# Patient Record
Sex: FEMALE | Race: WHITE | Employment: FULL TIME | URBAN - METROPOLITAN AREA
[De-identification: names, ages, dates, MRNs, and addresses within clinical notes are randomized per-mention and may not be internally consistent; named-entity substitution may affect disease eponyms.]

---

## 2017-01-04 ENCOUNTER — DOCUMENTATION ONLY (OUTPATIENT)
Dept: OTHER | Facility: CLINIC | Age: 57
End: 2017-01-04

## 2017-01-04 DIAGNOSIS — Z71.89 ADVANCE CARE PLANNING: Primary | Chronic | ICD-10-CM

## 2017-01-19 ENCOUNTER — RADIANT APPOINTMENT (OUTPATIENT)
Dept: MAMMOGRAPHY | Facility: CLINIC | Age: 57
End: 2017-01-19
Payer: COMMERCIAL

## 2017-01-19 ENCOUNTER — OFFICE VISIT (OUTPATIENT)
Dept: FAMILY MEDICINE | Facility: CLINIC | Age: 57
End: 2017-01-19
Payer: COMMERCIAL

## 2017-01-19 VITALS
TEMPERATURE: 97.1 F | BODY MASS INDEX: 26.24 KG/M2 | OXYGEN SATURATION: 99 % | HEART RATE: 90 BPM | WEIGHT: 157.5 LBS | HEIGHT: 65 IN | SYSTOLIC BLOOD PRESSURE: 118 MMHG | DIASTOLIC BLOOD PRESSURE: 78 MMHG

## 2017-01-19 DIAGNOSIS — Z12.31 VISIT FOR SCREENING MAMMOGRAM: ICD-10-CM

## 2017-01-19 DIAGNOSIS — L72.3 SEBACEOUS CYST: Primary | ICD-10-CM

## 2017-01-19 PROCEDURE — G0202 SCR MAMMO BI INCL CAD: HCPCS | Mod: TC

## 2017-01-19 PROCEDURE — 99212 OFFICE O/P EST SF 10 MIN: CPT | Performed by: FAMILY MEDICINE

## 2017-01-19 ASSESSMENT — PAIN SCALES - GENERAL: PAINLEVEL: NO PAIN (0)

## 2017-01-19 NOTE — MR AVS SNAPSHOT
After Visit Summary   1/19/2017    Jeni Portillo    MRN: [id number]           Patient Information     Date Of Birth          1960        Visit Information        Provider Department      1/19/2017 9:30 AM Rosanne Kimble MD [facility]        Today's Diagnoses     Sebaceous cyst    -  1       Care Instructions      Sebaceous Cyst [No Infection]  A sebaceous cyst is a term that most commonly refers to 2 types of cysts:  epidermoid  (from skin), and  pilar  (from hair follicles). A few things about these cysts:    A cyst is a sac filled with material that is often cheesy, fatty, oily, or fibrous material. The material in them can be thick (like cottage cheese) or liquid.    They form slowly under the skin, and can be found on most parts of the body. They are most often found in hairier areas like the scalp, face, upper back, and genitals.    You can usually move them slightly if you try.    They can be smaller than a pea or as large as a couple of inches.    They are usually not painful, unless they become inflamed or infected.    The area around the cyst may smell bad, and if the cyst breaks open, the material inside it often smells bad as well.  Causes  Sebaceous cysts are often caused when skin (epidermal) cells move under the skin surface, or are covered over by it, but continue to multiply, like skin does normally. They then form a wall around themselves (cyst) and secrete normal skin fluids (keratin). This can happen for developmental reason, but is often from skin trauma.  Cysts are often found around hair follicles, which in a sense are like cysts, but with openings, through which normal lubricating oils for your hair are excreted. The opening can become blocked or the site inflamed, causing a cyst. This often occurs when there is damage to the hair follicles by an abrasion or wound.  Symptoms  The following are symptoms of a sebaceous cyst:    Feeling a lump just beneath  the skin.    It may or may not be painful.    The cyst may or may not smell bad.    The cyst may become inflamed or red.    The cyst may leak fluid or thick material.  Home care  Sebaceous cysts often go away without any treatment. If your sebaceous cyst doesn't, and is bothersome, it may be drained or removed. If the cyst drains on its own, it may return. Resist the temptation to squeeze, pop, stick a needle in it, or cut it open. This often leads to an infection and scarring. If it gets severely inflamed or infected, you should seek medical treatment. Be sure to clean the cyst area when bathing or showering. Watch for the signs of infection listed below.  Follow-up care  Follow up with your doctor or as advised by our staff.  When to seek medical care  Get prompt medical attention if any of the following occur:    Swelling, redness, or pain    Pus coming from the cyst    1865-0189 [employer]. [address] [place] [address]. All rights reserved. This information is not intended as a substitute for professional medical care. Always follow your healthcare professional's instructions.      [facility]    If you have any questions regarding to your visit please contact your care team:       Team Purple:   Clinic Hours Telephone Number   [address]   7am-7pm  Monday - Thursday   7am-5pm  Fridays  [phone]  (Appointment scheduling available 24/7)    Questions about your Visit?   Team Line:  [phone]   Urgent Care - [place] - 11am-9pm Monday-Friday Saturday-Sunday- 9am-5pm   [place] - 5pm-9pm Monday-Friday Saturday-Sunday- 9am-5pm  [phone] - Rad[place] Bullock  [phone] - [place]       What options do I have for visits at the clinic other than the traditional office visit?  To expand how we care for you, many of our providers are utilizing electronic visits (e-visits) and  telephone visits, when medically appropriate, for interactions with their patients rather than a visit in the clinic.   We also offer nurse visits for many medical concerns. Just like any other service, we will bill your insurance company for this type of visit based on time spent on the phone with your provider. Not all insurance companies cover these visits. Please check with your medical insurance if this type of visit is covered. You will be responsible for any charges that are not paid by your insurance.      E-visits via [email or web address]:  generally incur a $35.00 fee.  Telephone visits:  Time spent on the phone: *charged based on time that is spent on the phone in increments of 10 minutes. Estimated cost:   5-10 mins $30.00   11-20 mins. $59.00   21-30 mins. $85.00     Use [employer] (secure email communication and access to your chart) to send your primary care provider a message or make an appointment. Ask someone on your Team how to sign up for [employer].  For a Price Quote for your services, please call our [employer] Line at [phone].  As always, Thank you for trusting us with your health care needs!    Discharged By: An          Follow-ups after your visit        Additional Services     DERMATOLOGY REFERRAL       Your provider has referred you to: [facility]: [facility]  [place] [phone]   [email or web address]  UM: Dermatology [facility] [phone]   [email or web address]  UM: [facility] [phone]   [email or web address]  [place]: [facility] [place] [phone]  [email or web address]  [facility]: [facility]  [place] [phone]   [email or web address]    Please be aware that coverage of these services is subject to the terms and limitations of your health insurance plan.  Call  "member services at your health plan with any benefit or coverage questions.      Please bring the following with you to your appointment:    (1) Any X-Rays, CTs or MRIs which have been performed.  Contact the facility where they were done to arrange for  prior to your scheduled appointment.    (2) List of current medications  (3) This referral request   (4) Any documents/labs given to you for this referral                  Who to contact     If you have questions or need follow up information about today's clinic visit or your schedule please contact [facility] directly at [phone].  Normal or non-critical lab and imaging results will be communicated to you by MyChart, letter or phone within 4 business days after the clinic has received the results. If you do not hear from us within 7 days, please contact the clinic through [employer] or phone. If you have a critical or abnormal lab result, we will notify you by phone as soon as possible.  Submit refill requests through [employer] or call your pharmacy and they will forward the refill request to us. Please allow 3 business days for your refill to be completed.          Additional Information About Your Visit        MyChart Information     [employer] gives you secure access to your electronic health record. If you see a primary care provider, you can also send messages to your care team and make appointments. If you have questions, please call your primary care clinic.  If you do not have a primary care provider, please call [phone] and they will assist you.        Care EveryWhere ID     This is your Care EveryWhere ID. This could be used by other organizations to access your [place] medical records  [id number]        Your Vitals Were     Pulse Temperature Height BMI (Body Mass Index) Pulse Oximetry       90 97.1  F (36.2  C) (Oral) 5' 5\" (1.651 m) 26.21 kg/m2 99%        Blood Pressure from Last 3 Encounters:   01/19/17 118/78   10/31/16 " 116/80   10/24/14 112/64    Weight from Last 3 Encounters:   01/19/17 157 lb 8 oz (71.442 kg)   10/31/16 159 lb (72.122 kg)   10/24/14 157 lb 8 oz (71.442 kg)              We Performed the Following     DERMATOLOGY REFERRAL          Today's Medication Changes          These changes are accurate as of: 1/19/17  9:53 AM.  If you have any questions, ask your nurse or doctor.               These medicines have changed or have updated prescriptions.        Dose/Directions    docosanol 10 % Crea cream   Commonly known as:  ABREVA   This may have changed:    - when to take this  - reasons to take this   Used for:  Cold sore        Apply  topically 5 times daily.   Quantity:  1 Tube   Refills:  5       fexofenadine 60 MG tablet   Commonly known as:  ALLEGRA   This may have changed:    - when to take this  - reasons to take this   Used for:  Seasonal allergies        Dose:  60 mg   Take 1 tablet by mouth 2 times daily.   Quantity:  180 tablet   Refills:  3                Primary Care Provider Office Phone # Fax #    Rosanne Kimble [phone]       [address] [address]        Thank you!     Thank you for choosing [facility]  for your care. Our goal is always to provide you with excellent care. Hearing back from our patients is one way we can continue to improve our services. Please take a few minutes to complete the written survey that you may receive in the mail after your visit with us. Thank you!             Your Updated Medication List - Protect others around you: Learn how to safely use, store and throw away your medicines at www.disposemymeds.org.          This list is accurate as of: 1/19/17  9:53 AM.  Always use your most recent med list.                   Brand Name Dispense Instructions for use    docosanol 10 % Crea cream    ABREVA    1 Tube    Apply  topically 5 times daily.       fexofenadine 60 MG tablet    ALLEGRA    180 tablet     Take 1 tablet by mouth 2 times daily.       ibuprofen 200 MG tablet    ADVIL/MOTRIN    100 tablet    Take 1 tablet by mouth every 4 hours as needed for pain.

## 2017-01-19 NOTE — PATIENT INSTRUCTIONS
Sebaceous Cyst [No Infection]  A sebaceous cyst is a term that most commonly refers to 2 types of cysts:  epidermoid  (from skin), and  pilar  (from hair follicles). A few things about these cysts:    A cyst is a sac filled with material that is often cheesy, fatty, oily, or fibrous material. The material in them can be thick (like cottage cheese) or liquid.    They form slowly under the skin, and can be found on most parts of the body. They are most often found in hairier areas like the scalp, face, upper back, and genitals.    You can usually move them slightly if you try.    They can be smaller than a pea or as large as a couple of inches.    They are usually not painful, unless they become inflamed or infected.    The area around the cyst may smell bad, and if the cyst breaks open, the material inside it often smells bad as well.  Causes  Sebaceous cysts are often caused when skin (epidermal) cells move under the skin surface, or are covered over by it, but continue to multiply, like skin does normally. They then form a wall around themselves (cyst) and secrete normal skin fluids (keratin). This can happen for developmental reason, but is often from skin trauma.  Cysts are often found around hair follicles, which in a sense are like cysts, but with openings, through which normal lubricating oils for your hair are excreted. The opening can become blocked or the site inflamed, causing a cyst. This often occurs when there is damage to the hair follicles by an abrasion or wound.  Symptoms  The following are symptoms of a sebaceous cyst:    Feeling a lump just beneath the skin.    It may or may not be painful.    The cyst may or may not smell bad.    The cyst may become inflamed or red.    The cyst may leak fluid or thick material.  Home care  Sebaceous cysts often go away without any treatment. If your sebaceous cyst doesn't, and is bothersome, it may be drained or removed. If the cyst drains on its own, it may  return. Resist the temptation to squeeze, pop, stick a needle in it, or cut it open. This often leads to an infection and scarring. If it gets severely inflamed or infected, you should seek medical treatment. Be sure to clean the cyst area when bathing or showering. Watch for the signs of infection listed below.  Follow-up care  Follow up with your doctor or as advised by our staff.  When to seek medical care  Get prompt medical attention if any of the following occur:    Swelling, redness, or pain    Pus coming from the cyst    8411-5386 [employer]. [address] [place] [address]. All rights reserved. This information is not intended as a substitute for professional medical care. Always follow your healthcare professional's instructions.      [facility]    If you have any questions regarding to your visit please contact your care team:       Team Purple:   Clinic Hours Telephone Number   [address] Dr[address]   7am-7pm  Monday - Thursday   7am-5pm  Fridays  [phone]  (Appointment scheduling available 24/7)    Questions about your Visit?   Team Line:  [phone]   [facility] - [place] [facility] - 11am-9pm Monday-Friday Saturday-Sunday- 9am-5pm   [place] - 5pm-9pm Monday-Friday Saturday-Sunday- 9am-5pm  [phone] - [place]   [phone] - [place]       What options do I have for visits at the clinic other than the traditional office visit?  To expand how we care for you, many of our providers are utilizing electronic visits (e-visits) and telephone visits, when medically appropriate, for interactions with their patients rather than a visit in the clinic.   We also offer nurse visits for many medical concerns. Just like any other service, we will bill your insurance company for this type of visit based on time spent on the phone with your provider. Not all insurance companies cover  these visits. Please check with your medical insurance if this type of visit is covered. You will be responsible for any charges that are not paid by your insurance.      E-visits via [employer]:  generally incur a $35.00 fee.  Telephone visits:  Time spent on the phone: *charged based on time that is spent on the phone in increments of 10 minutes. Estimated cost:   5-10 mins $30.00   11-20 mins. $59.00   21-30 mins. $85.00     Use [employer] (secure email communication and access to your chart) to send your primary care provider a message or make an appointment. Ask someone on your Team how to sign up for [employer].  For a Price Quote for your services, please call our Consumer Price Line at [phone].  As always, Thank you for trusting us with your health care needs!    Discharged By: Catherine

## 2017-01-19 NOTE — NURSING NOTE
"Chief Complaint   Patient presents with     Derm Problem     Cyst on back by right shoulder blade x 5 years, would like to have it removed.       Initial /78 mmHg  Pulse 90  Temp(Src) 97.1  F (36.2  C) (Oral)  Ht 5' 5\" (1.651 m)  Wt 157 lb 8 oz (71.442 kg)  BMI 26.21 kg/m2  SpO2 99% Estimated body mass index is 26.21 kg/(m^2) as calculated from the following:    Height as of this encounter: 5' 5\" (1.651 m).    Weight as of this encounter: 157 lb 8 oz (71.442 kg).  BP completed using cuff size: regular    "

## 2017-01-19 NOTE — PROGRESS NOTES
SUBJECTIVE:                                                    Jeni Portillo is a 56 year old female who presents to clinic today for the following health issues:    Chief Complaint   Patient presents with     Derm Problem     Cyst on back by right shoulder blade x 5 years, would like to have it removed.     Cyst under the skin on her right shoulder blade that has been present for the past 5 years, endorses occasional drainage. She had 2 other cysts removed in the past.  No pain, redness, or current drainage.     Of note, completed mammogram this morning.     Problem list and histories reviewed & adjusted, as indicated.  Additional history: as documented    Patient Active Problem List   Diagnosis     Fibrocystic breast disease     Decreased libido     CARDIOVASCULAR SCREENING; LDL GOAL LESS THAN 160     Neck pain     External hemorrhoids     Shoulder joint pain     Advance care planning     Past Surgical History   Procedure Laterality Date     Tonsillectomy       Bunionectomy  6-2011     left     Colonoscopy       Orthopedic surgery       Bunionectomy on both feet       Social History   Substance Use Topics     Smoking status: Never Smoker      Smokeless tobacco: Never Used     Alcohol Use: 0.0 oz/week     0 Standard drinks or equivalent per week      Comment: 3 drinks per week      Family History   Problem Relation Age of Onset     CANCER Maternal Grandfather      Lung cancer     CANCER Paternal Grandfather      Lung and brain cancer     Obesity Sister      DIABETES No family hx of      Hypertension No family hx of      Obesity Sister      Obesity Brother      Obesity Sister      Obesity Brother          BP Readings from Last 3 Encounters:   01/19/17 118/78   10/31/16 116/80   10/24/14 112/64    Wt Readings from Last 3 Encounters:   01/19/17 157 lb 8 oz (71.442 kg)   10/31/16 159 lb (72.122 kg)   10/24/14 157 lb 8 oz (71.442 kg)                    Problem list, Medication list, Allergies, and  "Medical/Social/Surgical histories reviewed in [facility] and updated as appropriate.    This document serves as a record of the services and decisions personally performed and made by Rosanne Kimble MD. It was created on his/her behalf by Fazal Cochran, a trained medical scribe. The creation of this document is based the provider's statements to the medical scribe.  Scribe Fazal Cochran 9:45 AM, January 19, 2017    OBJECTIVE:                                                    /78 mmHg  Pulse 90  Temp(Src) 97.1  F (36.2  C) (Oral)  Ht 5' 5\" (1.651 m)  Wt 157 lb 8 oz (71.442 kg)  BMI 26.21 kg/m2  SpO2 99%  Body mass index is 26.21 kg/(m^2).  GENERAL: healthy, alert and no distress  SKIN: 1 cm firm rubbery subcutaneous mass on the posterior right shoulder, non tender     Diagnostic Test Results:  none      ASSESSMENT/PLAN:                                                        ICD-10-CM    1. Sebaceous cyst L72.3 DERMATOLOGY REFERRAL       FUTURE APPOINTMENTS:       - Make appointment with dermatology specialist  Discussed this may not be covered by insurance, considered cosmetic procedure  Return in October for physical with pap smear.     The information in this document, created by the medical scribe for me, accurately reflects the services I personally performed and the decisions made by me. I have reviewed and approved this document for accuracy prior to leaving the patient care area.  Rosanne Kimble MD  9:45 AM, 01/19/2017    Rosanne Kimble MD  [facility]    Start 9:43 AM   End 9:48 AM        "

## 2017-11-28 ENCOUNTER — OFFICE VISIT (OUTPATIENT)
Dept: FAMILY MEDICINE | Facility: CLINIC | Age: 57
End: 2017-11-28
Payer: COMMERCIAL

## 2017-11-28 VITALS — DIASTOLIC BLOOD PRESSURE: 70 MMHG | SYSTOLIC BLOOD PRESSURE: 120 MMHG

## 2017-11-28 DIAGNOSIS — M67.432 GANGLION CYST OF WRIST, LEFT: Primary | ICD-10-CM

## 2017-11-28 PROCEDURE — 99213 OFFICE O/P EST LOW 20 MIN: CPT | Performed by: FAMILY MEDICINE

## 2017-11-28 NOTE — PATIENT INSTRUCTIONS
Ganglion Cyst: Hand    A ganglion cyst is a firm, fluid-filled lump that can suddenly appear on the front or back of the wrist or at the base of a finger. These cysts grow from normal tissue in the wrist and fingers, and range in size from a pea to a peach pit. Although ganglion cysts are common, they don t spread, and they don t become cancerous. They can occur after an injury, but many times it isn t known why they grow. Ganglion cysts can change in size, and may go away on their own.  Symptoms  A ganglion cyst is sometimes painful, especially when it first occurs. Constantly using your hand or wrist can make the cyst enlarge and hurt more. Some hand and wrist movements, such as grasping things, may also be difficult.  How a ganglion cyst develops  Your wrist and hand are made up of many small bones that meet at joints. Tendons attach muscles to the bones at the joints. The tendons allow the joints to bend and straighten. Both tendons and joints are lined with tissue called synovium. This tissue makes a thick fluid that keeps the joints and tendons moving easily. Sometimes the tissue balloons out from the joint or tendons and forms a cyst. As the cyst fills with fluid and grows, it appears as a lump you can feel.  Where ganglion cysts occur  A ganglion cyst can occur anywhere on the hand near a joint. Cysts most commonly appear on the back or palm side of the wrist, or on the palm at the base of a finger. Your doctor can usually diagnose a cyst by examining the lump. He or she may draw off a little fluid or order an X-ray to rule out other problems.  Treating a ganglion cyst  Your healthcare provider may just watch your ganglion cyst. Many shrink and become painless without treatment. Some disappear altogether. If the cyst is unsightly or painful, or makes it hard for you to use your hand, your healthcare provider can treat it or, if needed, remove it surgically.  Nonsurgical treatment  To shrink the cyst, your  provider may remove (aspirate) the fluid with a needle. If the cyst hurts, your provider may also give you an injection of an anti-inflammatory, such as cortisone, to relieve the irritation. Your hand may then be wrapped to help keep the cyst from recurring.  Surgery  If the cyst reappears after treatment, your healthcare provider may remove it surgically. A section of the tissue that lines the joint or tendon is removed along with the cyst. This helps prevent another cyst from forming, although recurrence of the cyst is still possible after surgery. Usually, only your hand or arm is numbed, and you can go home a few hours after surgery. Your hand may be in a splint for several days.  Date Last Reviewed: 9/10/2015    2007-1649 The [employer]. [address] [place] [address], [place] [address]. All rights reserved. This information is not intended as a substitute for professional medical care. Always follow your healthcare professional's instructions.      [facility]    If you have any questions regarding to your visit please contact your care team:       Team Purple:   Clinic Hours Telephone Number   Dr. Eliana Johnson   7am-7pm  Monday - Thursday   7am-5pm  Fridays  [phone]  (Appointment scheduling available 24/7)    Questions about your Visit?   Team Line:  [phone]   [facility] - 11am-9pm Monday-Friday Saturday-Sunday- 9am-5pm   [place] - 5pm-9pm Monday-Friday Saturday-Sunday- 9am-5pm  [phone] - [place]   [phone] - [place]       What options do I have for visits at the clinic other than the traditional office visit?  To expand how we care for you, many of our providers are utilizing electronic visits (e-visits) and telephone visits, when medically appropriate, for interactions with their patients rather than a visit in the clinic.   We also offer nurse visits for many  medical concerns. Just like any other service, we will bill your insurance company for this type of visit based on time spent on the phone with your provider. Not all insurance companies cover these visits. Please check with your medical insurance if this type of visit is covered. You will be responsible for any charges that are not paid by your insurance.      E-visits via [employer]:  generally incur a $35.00 fee.  Telephone visits:  Time spent on the phone: *charged based on time that is spent on the phone in increments of 10 minutes. Estimated cost:   5-10 mins $30.00   11-20 mins. $59.00   21-30 mins. $85.00     Use [employer] (secure email communication and access to your chart) to send your primary care provider a message or make an appointment. Ask someone on your Team how to sign up for [employer].  For a Price Quote for your services, please call our Consumer Price Line at [phone].  As always, Thank you for trusting us with your health care needs!    Discharged By: Catherine

## 2017-11-28 NOTE — MR AVS SNAPSHOT
After Visit Summary   11/28/2017    Jeni Portillo    MRN: [id number]           Patient Information     Date Of Birth          1960        Visit Information        Provider Department      11/28/2017 9:00 AM Keegan Kat MD [facility]        Today's Diagnoses     Ganglion cyst of wrist, left    -  1      Care Instructions      Ganglion Cyst: Hand    A ganglion cyst is a firm, fluid-filled lump that can suddenly appear on the front or back of the wrist or at the base of a finger. These cysts grow from normal tissue in the wrist and fingers, and range in size from a pea to a peach pit. Although ganglion cysts are common, they don t spread, and they don t become cancerous. They can occur after an injury, but many times it isn t known why they grow. Ganglion cysts can change in size, and may go away on their own.  Symptoms  A ganglion cyst is sometimes painful, especially when it first occurs. Constantly using your hand or wrist can make the cyst enlarge and hurt more. Some hand and wrist movements, such as grasping things, may also be difficult.  How a ganglion cyst develops  Your wrist and hand are made up of many small bones that meet at joints. Tendons attach muscles to the bones at the joints. The tendons allow the joints to bend and straighten. Both tendons and joints are lined with tissue called synovium. This tissue makes a thick fluid that keeps the joints and tendons moving easily. Sometimes the tissue balloons out from the joint or tendons and forms a cyst. As the cyst fills with fluid and grows, it appears as a lump you can feel.  Where ganglion cysts occur  A ganglion cyst can occur anywhere on the hand near a joint. Cysts most commonly appear on the back or palm side of the wrist, or on the palm at the base of a finger. Your doctor can usually diagnose a cyst by examining the lump. He or she may draw off a little fluid or order an X-ray to rule out  other problems.  Treating a ganglion cyst  Your healthcare provider may just watch your ganglion cyst. Many shrink and become painless without treatment. Some disappear altogether. If the cyst is unsightly or painful, or makes it hard for you to use your hand, your healthcare provider can treat it or, if needed, remove it surgically.  Nonsurgical treatment  To shrink the cyst, your provider may remove (aspirate) the fluid with a needle. If the cyst hurts, your provider may also give you an injection of an anti-inflammatory, such as cortisone, to relieve the irritation. Your hand may then be wrapped to help keep the cyst from recurring.  Surgery  If the cyst reappears after treatment, your healthcare provider may remove it surgically. A section of the tissue that lines the joint or tendon is removed along with the cyst. This helps prevent another cyst from forming, although recurrence of the cyst is still possible after surgery. Usually, only your hand or arm is numbed, and you can go home a few hours after surgery. Your hand may be in a splint for several days.  Date Last Reviewed: 9/10/2015    4720-2253 [employer]. [address] [place] [address]. All rights reserved. This information is not intended as a substitute for professional medical care. Always follow your healthcare professional's instructions.      [facility]    If you have any questions regarding to your visit please contact your care team:       Team Purple:   Clinic Hours Telephone Number   Dr. Eliana Johnson   7am-7pm  Monday - Thursday   7am-5pm  Fridays  [phone]  (Appointment scheduling available 24/7)    Questions about your Visit?   Team Line:  [phone]   Urgent Care - [facility] Hanks and [place] [facility] - 11am-9pm Monday-Friday Saturday-Sunday- 9am-5pm   [place] - 5pm-9pm Monday-Friday Saturday-Sunday- 9am-5pm  [phone] -  [place]   [phone] - [place]       What options do I have for visits at the clinic other than the traditional office visit?  To expand how we care for you, many of our providers are utilizing electronic visits (e-visits) and telephone visits, when medically appropriate, for interactions with their patients rather than a visit in the clinic.   We also offer nurse visits for many medical concerns. Just like any other service, we will bill your insurance company for this type of visit based on time spent on the phone with your provider. Not all insurance companies cover these visits. Please check with your medical insurance if this type of visit is covered. You will be responsible for any charges that are not paid by your insurance.      E-visits via [employer]:  generally incur a $35.00 fee.  Telephone visits:  Time spent on the phone: *charged based on time that is spent on the phone in increments of 10 minutes. Estimated cost:   5-10 mins $30.00   11-20 mins. $59.00   21-30 mins. $85.00     Use [employer] (secure email communication and access to your chart) to send your primary care provider a message or make an appointment. Ask someone on your Team how to sign up for [employer].  For a Price Quote for your services, please call our Accredible Price Line at [phone].  As always, Thank you for trusting us with your health care needs!    Discharged By: An            Follow-ups after your visit        Additional Services     ORTHOPEDICS ADULT REFERRAL       Your provider has referred you to: [facility]: [facility] [phone]    [email or web address]    Please be aware that coverage of these services is subject to the terms and limitations of your health insurance plan.  Call member services at your health plan with any benefit or coverage questions.      Please bring the following to your appointment:    >>   Any x-rays, CTs or MRIs which have been performed.  Contact the  facility where they were done to arrange for  prior to your scheduled appointment.    >>   List of current medications   >>   This referral request   >>   Any documents/labs given to you for this referral                  Follow-up notes from your care team     Return in about 3 months (around 2/28/2018) for ganglion cyst.      Your next 10 appointments already scheduled     Feb 12, 2018  2:15 PM CST   PHYSICAL with Rosanne Kimble MD   [facility] ([address]  [place] [address] [phone]              Who to contact     If you have questions or need follow up information about today's clinic visit or your schedule please contact [facility] directly at [phone].  Normal or non-critical lab and imaging results will be communicated to you by MyChart, letter or phone within 4 business days after the clinic has received the results. If you do not hear from us within 7 days, please contact the clinic through MyChart or phone. If you have a critical or abnormal lab result, we will notify you by phone as soon as possible.  Submit refill requests through [employer] or call your pharmacy and they will forward the refill request to us. Please allow 3 business days for your refill to be completed.          Additional Information About Your Visit        [employer] Information     [employer] gives you secure access to your electronic health record. If you see a primary care provider, you can also send messages to your care team and make appointments. If you have questions, please call your primary care clinic.  If you do not have a primary care provider, please call [phone] and they will assist you.        Care EveryWhere ID     This is your Care EveryWhere ID. This could be used by other organizations to access your [place] medical records  [id number]         Blood Pressure from Last 3 Encounters:   11/28/17 120/70   01/19/17 118/78    10/31/16 116/80    Weight from Last 3 Encounters:   11/28/17 (P) 159 lb 9.6 oz (72.4 kg)   01/19/17 157 lb 8 oz (71.4 kg)   10/31/16 159 lb (72.1 kg)              We Performed the Following     ORTHOPEDICS ADULT REFERRAL          Today's Medication Changes          These changes are accurate as of: 11/28/17  9:39 AM.  If you have any questions, ask your nurse or doctor.               These medicines have changed or have updated prescriptions.        Dose/Directions    docosanol 10 % Crea cream   Commonly known as:  ABREVA   This may have changed:    - when to take this  - reasons to take this   Used for:  Cold sore        Apply  topically 5 times daily.   Quantity:  1 Tube   Refills:  5       fexofenadine 60 MG tablet   Commonly known as:  ALLEGRA   This may have changed:    - when to take this  - reasons to take this   Used for:  Seasonal allergies        Dose:  60 mg   Take 1 tablet by mouth 2 times daily.   Quantity:  180 tablet   Refills:  3                Primary Care Provider Office Phone # Fax #    Rosanne Kimble [phone]       [facility] [facility] [address]        Equal Access to Services     [facility]: Hadii corey ku hadasho Soomaali, waaxda luqadaha, qaybta kaalmada adeegyada, monique ward. So [facility] [phone].    ATENCIÓN: Si habla español, tiene a duncan disposición servicios gratuitos de asistencia lingüística. Llame al [phone].    We comply with applicable federal civil rights laws and [place] laws. We do not discriminate on the basis of race, color, national origin, age, disability, sex, sexual orientation, or gender identity.            Thank you!     Thank you for choosing [facility]  for your care. Our goal is always to provide you with excellent care. Hearing back from our patients is one way we can continue to improve our services. Please take a few minutes to complete the written survey that you may receive in  the mail after your visit with us. Thank you!             Your Updated Medication List - Protect others around you: Learn how to safely use, store and throw away your medicines at www.disposemymeds.org.          This list is accurate as of: 11/28/17  9:39 AM.  Always use your most recent med list.                   Brand Name Dispense Instructions for use Diagnosis    docosanol 10 % Crea cream    ABREVA    1 Tube    Apply  topically 5 times daily.    Cold sore       fexofenadine 60 MG tablet    ALLEGRA    180 tablet    Take 1 tablet by mouth 2 times daily.    Seasonal allergies       ibuprofen 200 MG tablet    ADVIL/MOTRIN    100 tablet    Take 1 tablet by mouth every 4 hours as needed for pain.    Bunion, left foot

## 2017-11-28 NOTE — PROGRESS NOTES
"  SUBJECTIVE:   Jeni Portillo is a 57 year old female who presents to clinic today for the following health issues:      Patient presents with:  Mass: on left wrist x 6 weeks    Mobile, compressible, appears upon wrist extension, not painful, no drainage pr redness.    Problem list and histories reviewed & adjusted, as indicated.  Additional history: as documented    BP Readings from Last 3 Encounters:   11/28/17 120/70   01/19/17 118/78   10/31/16 116/80    Wt Readings from Last 3 Encounters:   11/28/17 (P) 159 lb 9.6 oz (72.4 kg)   01/19/17 157 lb 8 oz (71.4 kg)   10/31/16 159 lb (72.1 kg)                      Reviewed and updated as needed this visit by clinical staffTobacco  Allergies  Meds  Problems  Med Hx  Surg Hx  Fam Hx  Soc Hx        Reviewed and updated as needed this visit by Provider  Allergies  Meds  Problems         ROS:  Constitutional, HEENT, cardiovascular, pulmonary, gi and gu systems are negative, except as otherwise noted.      OBJECTIVE:   /70  Pulse (P) 94  Temp (P) 98  F (36.7  C) (Oral)  Ht (P) 5' 5\" (1.651 m)  Wt (P) 159 lb 9.6 oz (72.4 kg)  SpO2 (P) 98%  BMI (P) 26.56 kg/m2  Body mass index is 26.56 kg/(m^2) (pended).  GENERAL: healthy, alert and no distress  NECK: no adenopathy, no asymmetry, masses, or scars and thyroid normal to palpation  RESP: lungs clear to auscultation - no rales, rhonchi or wheezes  CV: regular rate and rhythm, normal S1 S2, no S3 or S4, no murmur, click or rub, no peripheral edema and peripheral pulses strong  MS: Mobile, compressible mass at medial side extensor surface 1cm in diameter, no erythema or fluctuance, non-tender. no gross musculoskeletal defects noted, no edema  SKIN: no suspicious lesions or rashes  NEURO: Normal strength and tone, mentation intact and speech normal  PSYCH: mentation appears normal, affect normal/bright    Diagnostic Test Results:  none     ASSESSMENT/PLAN:   1. Ganglion cyst of wrist, left  Will refer to ortho " for possible drainage, patient was reassured that this goes away spontaneously in 50% of patients.  - ORTHOPEDICS ADULT REFERRAL    Follow-up in 3 months    Keegan Johnson MD  [facility]

## 2017-11-28 NOTE — NURSING NOTE
"Chief Complaint   Patient presents with     Mass     on left wrist x 6 weeks       Initial /70  Pulse (P) 94  Temp (P) 98  F (36.7  C) (Oral)  Ht (P) 5' 5\" (1.651 m)  Wt (P) 159 lb 9.6 oz (72.4 kg)  SpO2 (P) 98%  BMI (P) 26.56 kg/m2 Estimated body mass index is 26.56 kg/(m^2) (pended) as calculated from the following:    Height as of this encounter: (P) 5' 5\" (1.651 m).    Weight as of this encounter: (P) 159 lb 9.6 oz (72.4 kg).  Medication Reconciliation: complete     An ASH Miner    "

## 2017-12-30 ENCOUNTER — HEALTH MAINTENANCE LETTER (OUTPATIENT)
Age: 57
End: 2017-12-30

## 2018-02-01 ENCOUNTER — MYC MEDICAL ADVICE (OUTPATIENT)
Dept: FAMILY MEDICINE | Facility: CLINIC | Age: 58
End: 2018-02-01

## 2018-02-01 DIAGNOSIS — Z13.1 SCREENING FOR DIABETES MELLITUS: ICD-10-CM

## 2018-02-01 DIAGNOSIS — Z13.6 CARDIOVASCULAR SCREENING; LDL GOAL LESS THAN 160: Primary | ICD-10-CM

## 2018-02-01 NOTE — TELEPHONE ENCOUNTER
Patient requesting previsit lab work (appt on 2/12/18), her insurance is asking for Cholesterol and Glucose testing.  Lipid panel pending, any other labs patient should have?    Bhumika Hernandez RN

## 2018-02-02 NOTE — PATIENT INSTRUCTIONS
Preventive Health Recommendations  Female Ages 50 - 64    Yearly exam: See your health care provider every year in order to  o Review health changes.   o Discuss preventive care.    o Review your medicines if your doctor has prescribed any.      Get a Pap test every three years (unless you have an abnormal result and your provider advises testing more often).    If you get Pap tests with HPV test, you only need to test every 5 years, unless you have an abnormal result.     You do not need a Pap test if your uterus was removed (hysterectomy) and you have not had cancer.    You should be tested each year for STDs (sexually transmitted diseases) if you're at risk.     Have a mammogram every 1 to 2 years.    Have a colonoscopy at age 50, or have a yearly FIT test (stool test). These exams screen for colon cancer.      Have a cholesterol test every 5 years, or more often if advised.    Have a diabetes test (fasting glucose) every three years. If you are at risk for diabetes, you should have this test more often.     If you are at risk for osteoporosis (brittle bone disease), think about having a bone density scan (DEXA).    Shots: Get a flu shot each year. Get a tetanus shot every 10 years.    Nutrition:     Eat at least 5 servings of fruits and vegetables each day.    Eat whole-grain bread, whole-wheat pasta and brown rice instead of white grains and rice.    Talk to your provider about Calcium and Vitamin D.     Lifestyle    Exercise at least 150 minutes a week (30 minutes a day, 5 days a week). This will help you control your weight and prevent disease.    Limit alcohol to one drink per day.    No smoking.     Wear sunscreen to prevent skin cancer.     See your dentist every six months for an exam and cleaning.    See your eye doctor every 1 to 2 years.      Call to schedule the colonoscopy and upper endoscopy  I'll fill out your work paperwork  The wrist area is either a ganglion cyst or a lipoma. Both are benign  and do no require any further treatment unless growing or painful.    [facility]    If you have any questions regarding to your visit please contact your care team:       Team Purple:   Clinic Hours Telephone Number   Dr. Eliana Johnson   7am-7pm  Monday - Thursday   7am-5pm  Fridays  [phone]  (Appointment scheduling available 24/7)    Questions about your Visit?   Team Line:  [phone]   Urgent Care - [place] Hanks and [place] - 11am-9pm Monday-Friday Saturday-Sunday- 9am-5pm   [place] - 5pm-9pm Monday-Friday Saturday-Sunday- 9am-5pm  [phone] - [place]   [phone] - [place]       What options do I have for visits at the clinic other than the traditional office visit?  To expand how we care for you, many of our providers are utilizing electronic visits (e-visits) and telephone visits, when medically appropriate, for interactions with their patients rather than a visit in the clinic.   We also offer nurse visits for many medical concerns. Just like any other service, we will bill your insurance company for this type of visit based on time spent on the phone with your provider. Not all insurance companies cover these visits. Please check with your medical insurance if this type of visit is covered. You will be responsible for any charges that are not paid by your insurance.      E-visits via [employer]:  generally incur a $35.00 fee.  Telephone visits:  Time spent on the phone: *charged based on time that is spent on the phone in increments of 10 minutes. Estimated cost:   5-10 mins $30.00   11-20 mins. $59.00   21-30 mins. $85.00     Use [employer] (secure email communication and access to your chart) to send your primary care provider a message or make an appointment. Ask someone on your Team how to sign up for [employer].  For a Price Quote for your services, please call our Consumer Price Line at [phone].  As  always, Thank you for trusting us with your health care needs!    Elena Trotter MA

## 2018-02-12 ENCOUNTER — OFFICE VISIT (OUTPATIENT)
Dept: FAMILY MEDICINE | Facility: CLINIC | Age: 58
End: 2018-02-12
Payer: COMMERCIAL

## 2018-02-12 ENCOUNTER — RADIANT APPOINTMENT (OUTPATIENT)
Dept: MAMMOGRAPHY | Facility: CLINIC | Age: 58
End: 2018-02-12
Attending: FAMILY MEDICINE
Payer: COMMERCIAL

## 2018-02-12 VITALS
HEART RATE: 102 BPM | BODY MASS INDEX: 26.89 KG/M2 | DIASTOLIC BLOOD PRESSURE: 70 MMHG | RESPIRATION RATE: 14 BRPM | HEIGHT: 65 IN | SYSTOLIC BLOOD PRESSURE: 120 MMHG | OXYGEN SATURATION: 99 % | TEMPERATURE: 96.8 F | WEIGHT: 161.4 LBS

## 2018-02-12 DIAGNOSIS — Z13.1 SCREENING FOR DIABETES MELLITUS: ICD-10-CM

## 2018-02-12 DIAGNOSIS — D12.6 TUBULAR ADENOMA OF COLON: ICD-10-CM

## 2018-02-12 DIAGNOSIS — Z23 NEED FOR PROPHYLACTIC VACCINATION AND INOCULATION AGAINST INFLUENZA: ICD-10-CM

## 2018-02-12 DIAGNOSIS — E66.3 OVERWEIGHT: ICD-10-CM

## 2018-02-12 DIAGNOSIS — Z13.6 CARDIOVASCULAR SCREENING; LDL GOAL LESS THAN 160: ICD-10-CM

## 2018-02-12 DIAGNOSIS — Z12.31 VISIT FOR SCREENING MAMMOGRAM: ICD-10-CM

## 2018-02-12 DIAGNOSIS — Z12.4 SCREENING FOR MALIGNANT NEOPLASM OF CERVIX: ICD-10-CM

## 2018-02-12 DIAGNOSIS — Z00.00 ENCOUNTER FOR ROUTINE ADULT HEALTH EXAMINATION WITHOUT ABNORMAL FINDINGS: Primary | ICD-10-CM

## 2018-02-12 LAB
CHOLEST SERPL-MCNC: 316 MG/DL
GLUCOSE SERPL-MCNC: 85 MG/DL (ref 70–99)
HDLC SERPL-MCNC: 104 MG/DL
LDLC SERPL CALC-MCNC: 181 MG/DL
NONHDLC SERPL-MCNC: 212 MG/DL
TRIGL SERPL-MCNC: 156 MG/DL

## 2018-02-12 PROCEDURE — 77067 SCR MAMMO BI INCL CAD: CPT | Mod: TC

## 2018-02-12 PROCEDURE — 80061 LIPID PANEL: CPT | Performed by: FAMILY MEDICINE

## 2018-02-12 PROCEDURE — 90686 IIV4 VACC NO PRSV 0.5 ML IM: CPT | Performed by: FAMILY MEDICINE

## 2018-02-12 PROCEDURE — G0145 SCR C/V CYTO,THINLAYER,RESCR: HCPCS | Performed by: FAMILY MEDICINE

## 2018-02-12 PROCEDURE — 90471 IMMUNIZATION ADMIN: CPT | Performed by: FAMILY MEDICINE

## 2018-02-12 PROCEDURE — 82947 ASSAY GLUCOSE BLOOD QUANT: CPT | Performed by: FAMILY MEDICINE

## 2018-02-12 PROCEDURE — 99396 PREV VISIT EST AGE 40-64: CPT | Mod: 25 | Performed by: FAMILY MEDICINE

## 2018-02-12 PROCEDURE — 36415 COLL VENOUS BLD VENIPUNCTURE: CPT | Performed by: FAMILY MEDICINE

## 2018-02-12 PROCEDURE — 87624 HPV HI-RISK TYP POOLED RSLT: CPT | Performed by: FAMILY MEDICINE

## 2018-02-12 NOTE — PROGRESS NOTES
SUBJECTIVE:   CC: Jeni Portillo is an 57 year old woman who presents for preventive health visit.     Physical   Annual:     Getting at least 3 servings of Calcium per day::  Yes    Bi-annual eye exam::  Yes    Dental care twice a year::  Yes    Sleep apnea or symptoms of sleep apnea::  None    Diet::  Regular (no restrictions)    Frequency of exercise::  6-7 days/week    Duration of exercise::  45-60 minutes    Taking medications regularly::  Not Applicable    Additional concerns today::  No                    Today's PHQ-2 Score:   PHQ-2 ( 1999 Pfizer) 2/9/2018   Q1: Little interest or pleasure in doing things 0   Q2: Feeling down, depressed or hopeless 0   PHQ-2 Score 0   Q1: Little interest or pleasure in doing things Not at all   Q2: Feeling down, depressed or hopeless Not at all   PHQ-2 Score 0       Abuse: Current or Past(Physical, Sexual or Emotional)- No  Do you feel safe in your environment - Yes    Social History   Substance Use Topics     Smoking status: Never Smoker     Smokeless tobacco: Never Used     Alcohol use 0.0 oz/week     0 Standard drinks or equivalent per week      Comment: 3 drinks per week      Alcohol Use 2/9/2018   If you drink alcohol, do you typically have greater than 3 drinks per day OR greater than 7 drinks per week?   No       Reviewed orders with patient.  Reviewed health maintenance and updated orders accordingly - Yes  Labs reviewed in EPIC  BP Readings from Last 3 Encounters:   02/12/18 120/70   11/28/17 120/70   01/19/17 118/78    Wt Readings from Last 3 Encounters:   02/12/18 161 lb 6.4 oz (73.2 kg)   11/28/17 (P) 159 lb 9.6 oz (72.4 kg)   01/19/17 157 lb 8 oz (71.4 kg)                  Patient Active Problem List   Diagnosis     Fibrocystic breast disease     Decreased libido     CARDIOVASCULAR SCREENING; LDL GOAL LESS THAN 160     Neck pain     External hemorrhoids     Shoulder joint pain     Advance care planning     Overweight     Past Surgical History:   Procedure  Laterality Date     BUNIONECTOMY  6-2011    left     COLONOSCOPY       ORTHOPEDIC SURGERY      Bunionectomy on both feet     TONSILLECTOMY         Social History   Substance Use Topics     Smoking status: Never Smoker     Smokeless tobacco: Never Used     Alcohol use 0.0 oz/week     0 Standard drinks or equivalent per week      Comment: 3 drinks per week      Family History   Problem Relation Age of Onset     CANCER Maternal Grandfather      Lung cancer     CANCER Paternal Grandfather      Lung and brain cancer     Obesity Sister      Obesity Sister      Obesity Brother      DIABETES No family hx of      Hypertension No family hx of            Patient over age 50, mutual decision to screen reflected in health maintenance.    Pertinent mammograms are reviewed under the imaging tab.  History of abnormal Pap smear: NO - age 30-65 PAP every 5 years with negative HPV co-testing recommended    Reviewed and updated as needed this visit by clinical staff         Reviewed and updated as needed this visit by Provider            Review of Systems   All other systems reviewed and are negative.         OBJECTIVE:   There were no vitals taken for this visit.  Physical Exam   Constitutional: She is oriented to person, place, and time. She appears well-developed and well-nourished. No distress.   overweight   HENT:   Right Ear: External ear normal.   Left Ear: External ear normal.   Nose: Nose normal.   Mouth/Throat: Oropharynx is clear and moist. No oropharyngeal exudate.   Eyes: Conjunctivae are normal. Pupils are equal, round, and reactive to light. Right eye exhibits no discharge. Left eye exhibits no discharge.   Neck: Neck supple. No tracheal deviation present. No thyromegaly present.   Cardiovascular: Normal rate, regular rhythm, S1 normal, S2 normal, normal heart sounds and normal pulses.  Exam reveals no S3, no S4 and no friction rub.    No murmur heard.  Pulmonary/Chest: Effort normal and breath sounds normal. No  respiratory distress. She has no wheezes. She has no rales.   Abdominal: Soft. Bowel sounds are normal. She exhibits no mass. There is no hepatosplenomegaly. There is no tenderness.   Genitourinary: Uterus normal. No breast swelling, tenderness or discharge. Cervix exhibits no motion tenderness and no discharge. No vaginal discharge found.   Genitourinary Comments: Mild atrophic vaginitis   Musculoskeletal: Normal range of motion. She exhibits no edema.   Lymphadenopathy:     She has no cervical adenopathy.   Neurological: She is alert and oriented to person, place, and time. She has normal strength and normal reflexes. She exhibits normal muscle tone.   Skin: Skin is warm and dry. No rash noted.   Psychiatric: She has a normal mood and affect. Judgment and thought content normal. Cognition and memory are normal.         ASSESSMENT/PLAN:       ICD-10-CM    1. Encounter for routine adult health examination without abnormal findings Z00.00    2. Tubular adenoma of colon D12.6 GASTROENTEROLOGY ADULT REF PROCEDURE ONLY Other   3. Overweight E66.3    4. Screening for malignant neoplasm of cervix Z12.4 GASTROENTEROLOGY ADULT REF PROCEDURE ONLY Other     Pap imaged thin layer screen with HPV - recommended age 30 - 65 years (select HPV order below)     HPV High Risk Types DNA Cervical   5. Need for prophylactic vaccination and inoculation against influenza Z23 FLU VAC, SPLIT VIRUS IM > 3 YO (QUADRIVALENT) [40636]     Vaccine Administration, Initial [69504]       COUNSELING:  Reviewed preventive health counseling, as reflected in patient instructions       Regular exercise       Healthy diet/nutrition       Immunizations    Vaccinated for: Influenza             Osteoporosis Prevention/Bone Health       (Sarina)menopause management    BP Screening:   Last 3 BP Readings:    BP Readings from Last 3 Encounters:   02/12/18 120/70   11/28/17 120/70   01/19/17 118/78       The following was recommended to the patient:  Re-screen BP  "within a year and recommended lifestyle modifications     reports that she has never smoked. She has never used smokeless tobacco.    Estimated body mass index is 26.56 kg/(m^2) (pended) as calculated from the following:    Height as of 11/28/17: (P) 5' 5\" (1.651 m).    Weight as of 11/28/17: (P) 159 lb 9.6 oz (72.4 kg).   Weight management plan: Discussed healthy diet and exercise guidelines and patient will follow up in 12 months in clinic to re-evaluate.    Counseling Resources:  ATP IV Guidelines  Pooled Cohorts Equation Calculator  Breast Cancer Risk Calculator  FRAX Risk Assessment  ICSI Preventive Guidelines  Dietary Guidelines for Americans, 2010  USDA's MyPlate  ASA Prophylaxis  Lung CA Screening    Rosanne Kimble MD  [facility]  "

## 2018-02-12 NOTE — MR AVS SNAPSHOT
After Visit Summary   2/12/2018    Jeni Portillo    MRN: [id number]           Patient Information     Date Of Birth          1960        Visit Information        Provider Department      2/12/2018 2:15 PM Rosanne Kimble MD [facility]        Today's Diagnoses     Tubular adenoma of colon    -  1    Screening for malignant neoplasm of cervix        Need for prophylactic vaccination and inoculation against influenza          Care Instructions      Preventive Health Recommendations  Female Ages 50 - 64    Yearly exam: See your health care provider every year in order to  o Review health changes.   o Discuss preventive care.    o Review your medicines if your doctor has prescribed any.      Get a Pap test every three years (unless you have an abnormal result and your provider advises testing more often).    If you get Pap tests with HPV test, you only need to test every 5 years, unless you have an abnormal result.     You do not need a Pap test if your uterus was removed (hysterectomy) and you have not had cancer.    You should be tested each year for STDs (sexually transmitted diseases) if you're at risk.     Have a mammogram every 1 to 2 years.    Have a colonoscopy at age 50, or have a yearly FIT test (stool test). These exams screen for colon cancer.      Have a cholesterol test every 5 years, or more often if advised.    Have a diabetes test (fasting glucose) every three years. If you are at risk for diabetes, you should have this test more often.     If you are at risk for osteoporosis (brittle bone disease), think about having a bone density scan (DEXA).    Shots: Get a flu shot each year. Get a tetanus shot every 10 years.    Nutrition:     Eat at least 5 servings of fruits and vegetables each day.    Eat whole-grain bread, whole-wheat pasta and brown rice instead of white grains and rice.    Talk to your provider about Calcium and Vitamin D.     Lifestyle    Exercise at  least 150 minutes a week (30 minutes a day, 5 days a week). This will help you control your weight and prevent disease.    Limit alcohol to one drink per day.    No smoking.     Wear sunscreen to prevent skin cancer.     See your dentist every six months for an exam and cleaning.    See your eye doctor every 1 to 2 years.      Call to schedule the colonoscopy and upper endoscopy  I'll fill out your work paperwork  The wrist area is either a ganglion cyst or a lipoma. Both are benign and do no require any further treatment unless growing or painful.    [facility]    If you have any questions regarding to your visit please contact your care team:       Team Purple:   Clinic Hours Telephone Number   Dr. Eliana Johnson   7am-7pm  Monday - Thursday   7am-5pm  Fridays  [phone]  (Appointment scheduling available 24/7)    Questions about your Visit?   Team Line:  [phone]   Urgent Care - [place] - 11am-9pm Monday-Friday Saturday-Sunday- 9am-5pm   [place] - 5pm-9pm Monday-Friday Saturday-Sunday- 9am-5pm  [phone] - [place]   [phone] - [place]       What options do I have for visits at the clinic other than the traditional office visit?  To expand how we care for you, many of our providers are utilizing electronic visits (e-visits) and telephone visits, when medically appropriate, for interactions with their patients rather than a visit in the clinic.   We also offer nurse visits for many medical concerns. Just like any other service, we will bill your insurance company for this type of visit based on time spent on the phone with your provider. Not all insurance companies cover these visits. Please check with your medical insurance if this type of visit is covered. You will be responsible for any charges that are not paid by your insurance.      E-visits via "[employer]:  generally incur a $35.00  fee.  Telephone visits:  Time spent on the phone: *charged based on time that is spent on the phone in increments of 10 minutes. Estimated cost:   5-10 mins $30.00   11-20 mins. $59.00   21-30 mins. $85.00     Use [employer] (secure email communication and access to your chart) to send your primary care provider a message or make an appointment. Ask someone on your Team how to sign up for [employer].  For a Price Quote for your services, please call our [employer] Line at [phone].  As always, Thank you for trusting us with your health care needs!    Elena Trotter MA            Follow-ups after your visit        Additional Services     GASTROENTEROLOGY ADULT REF PROCEDURE ONLY Other       Last Lab Result: No results found for: CR  Body mass index is 27.07 kg/(m^2).     Needed:  No  Language:  English    Patient will be contacted to schedule procedure.     Please be aware that coverage of these services is subject to the terms and limitations of your health insurance plan.  Call member services at your health plan with any benefit or coverage questions.  Any procedures must be performed at a [facility] facility OR coordinated by your clinic's referral office.    Please bring the following with you to your appointment:    (1) Any X-Rays, CTs or MRIs which have been performed.  Contact the facility where they were done to arrange for  prior to your scheduled appointment.    (2) List of current medications   (3) This referral request   (4) Any documents/labs given to you for this referral                  Your next 10 appointments already scheduled     Feb 12, 2018  3:15 PM CST   (Arrive by 3:00 PM)   MA SCREENING DIGITAL BILATERAL with [id number]   [facility] ([facility]    [address] [place] [id number] [phone]           Do not use any powder, lotion or deodorant under your arms or on your breast. If you do, we will ask you to remove it before your  "exam.  Wear comfortable, two-piece clothing.  If you have any allergies, tell your care team.  Bring any previous mammograms from other facilities or have them mailed to the breast center.              Who to contact     If you have questions or need follow up information about today's clinic visit or your schedule please contact [facility] directly at [phone].  Normal or non-critical lab and imaging results will be communicated to you by MyChart, letter or phone within 4 business days after the clinic has received the results. If you do not hear from us within 7 days, please contact the clinic through "[employer]t or phone. If you have a critical or abnormal lab result, we will notify you by phone as soon as possible.  Submit refill requests through [facility] or call your pharmacy and they will forward the refill request to us. Please allow 3 business days for your refill to be completed.          Additional Information About Your Visit        [employer] Information     [facility] gives you secure access to your electronic health record. If you see a primary care provider, you can also send messages to your care team and make appointments. If you have questions, please call your primary care clinic.  If you do not have a primary care provider, please call [phone] and they will assist you.        Care EveryWhere ID     This is your Care EveryWhere ID. This could be used by other organizations to access your [facility] medical records  [id number]        Your Vitals Were     Pulse Temperature Respirations Height Pulse Oximetry BMI (Body Mass Index)    102 96.8  F (36  C) (Oral) 14 5' 4.75\" (1.645 m) 99% 27.07 kg/m2       Blood Pressure from Last 3 Encounters:   02/12/18 120/70   11/28/17 120/70   01/19/17 118/78    Weight from Last 3 Encounters:   02/12/18 161 lb 6.4 oz (73.2 kg)   11/28/17 (P) 159 lb 9.6 oz (72.4 kg)   01/19/17 157 lb 8 oz (71.4 kg)              We Performed the Following     " GASTROENTEROLOGY ADULT REF PROCEDURE ONLY Other     HPV High Risk Types DNA Cervical     Pap imaged thin layer screen with HPV - recommended age 30 - 65 years (select HPV order below)          Today's Medication Changes          These changes are accurate as of 2/12/18  3:06 PM.  If you have any questions, ask your nurse or doctor.               These medicines have changed or have updated prescriptions.        Dose/Directions    docosanol 10 % Crea cream   Commonly known as:  ABREVA   This may have changed:    - when to take this  - reasons to take this   Used for:  Cold sore        Apply  topically 5 times daily.   Quantity:  1 Tube   Refills:  5       fexofenadine 60 MG tablet   Commonly known as:  ALLEGRA   This may have changed:    - when to take this  - reasons to take this   Used for:  Seasonal allergies        Dose:  60 mg   Take 1 tablet by mouth 2 times daily.   Quantity:  180 tablet   Refills:  3                Primary Care Provider Office Phone # Fax #    Rosanne Kimble [phone] [address] [facility] [address]        Equal Access to Services     [facility] AH: Hadii aad ku hadasho Soomaali, waaxda luqadaha, qaybta kaalmada adeegyada, waxay priscillain hayaubrey garcia . [facility] [phone].    ATENCIÓN: Si habla español, tiene a duncan disposición servicios gratuitos de asistencia lingüística. [facility] [phone].    We comply with applicable federal civil rights laws and [place] laws. We do not discriminate on the basis of race, color, national origin, age, disability, sex, sexual orientation, or gender identity.            Thank you!     Thank you for choosing [facility]  for your care. Our goal is always to provide you with excellent care. Hearing back from our patients is one way we can continue to improve our services. Please take a few minutes to complete the written survey that you may receive in the mail after your visit with us. Thank  you!             Your Updated Medication List - Protect others around you: Learn how to safely use, store and throw away your medicines at www.disposemymeds.org.          This list is accurate as of 2/12/18  3:06 PM.  Always use your most recent med list.                   Brand Name Dispense Instructions for use Diagnosis    docosanol 10 % Crea cream    ABREVA    1 Tube    Apply  topically 5 times daily.    Cold sore       fexofenadine 60 MG tablet    ALLEGRA    180 tablet    Take 1 tablet by mouth 2 times daily.    Seasonal allergies       ibuprofen 200 MG tablet    ADVIL/MOTRIN    100 tablet    Take 1 tablet by mouth every 4 hours as needed for pain.    Bunion, left foot

## 2018-02-12 NOTE — PROGRESS NOTES

## 2018-02-13 ENCOUNTER — MYC MEDICAL ADVICE (OUTPATIENT)
Dept: FAMILY MEDICINE | Facility: CLINIC | Age: 58
End: 2018-02-13

## 2018-02-13 NOTE — TELEPHONE ENCOUNTER
Dr. Kimble,    Please see patients MyChart message and advise on list of lubricants as OV note is not finished from 2/12/2018.     Thank you,  Jemma Clay RN

## 2018-02-14 LAB
COPATH REPORT: NORMAL
PAP: NORMAL

## 2018-02-15 PROBLEM — E66.3 OVERWEIGHT: Status: ACTIVE | Noted: 2018-02-15

## 2018-02-15 LAB
FINAL DIAGNOSIS: NORMAL
HPV HR 12 DNA CVX QL NAA+PROBE: NEGATIVE
HPV16 DNA SPEC QL NAA+PROBE: NEGATIVE
HPV18 DNA SPEC QL NAA+PROBE: NEGATIVE
SPECIMEN DESCRIPTION: NORMAL
SPECIMEN SOURCE CVX/VAG CYTO: NORMAL

## 2018-04-12 ENCOUNTER — TRANSFERRED RECORDS (OUTPATIENT)
Dept: HEALTH INFORMATION MANAGEMENT | Facility: CLINIC | Age: 58
End: 2018-04-12

## 2018-04-12 PROBLEM — D12.6 ADENOMATOUS POLYP OF COLON, UNSPECIFIED PART OF COLON: Status: ACTIVE | Noted: 2018-04-01

## 2018-04-14 ENCOUNTER — HEALTH MAINTENANCE LETTER (OUTPATIENT)
Age: 58
End: 2018-04-14

## 2018-06-05 ENCOUNTER — TRANSFERRED RECORDS (OUTPATIENT)
Dept: HEALTH INFORMATION MANAGEMENT | Facility: CLINIC | Age: 58
End: 2018-06-05

## 2018-06-21 ENCOUNTER — TRANSFERRED RECORDS (OUTPATIENT)
Dept: HEALTH INFORMATION MANAGEMENT | Facility: CLINIC | Age: 58
End: 2018-06-21

## 2018-07-05 ENCOUNTER — TRANSFERRED RECORDS (OUTPATIENT)
Dept: HEALTH INFORMATION MANAGEMENT | Facility: CLINIC | Age: 58
End: 2018-07-05

## 2019-01-09 ENCOUNTER — OFFICE VISIT (OUTPATIENT)
Dept: FAMILY MEDICINE | Facility: CLINIC | Age: 59
End: 2019-01-09
Payer: COMMERCIAL

## 2019-01-09 VITALS
DIASTOLIC BLOOD PRESSURE: 87 MMHG | HEART RATE: 66 BPM | RESPIRATION RATE: 16 BRPM | TEMPERATURE: 95.6 F | SYSTOLIC BLOOD PRESSURE: 143 MMHG | OXYGEN SATURATION: 98 %

## 2019-01-09 DIAGNOSIS — Z71.84 TRAVEL ADVICE ENCOUNTER: Primary | ICD-10-CM

## 2019-01-09 PROCEDURE — 90471 IMMUNIZATION ADMIN: CPT | Performed by: NURSE PRACTITIONER

## 2019-01-09 PROCEDURE — 90707 MMR VACCINE SC: CPT | Mod: GA | Performed by: NURSE PRACTITIONER

## 2019-01-09 PROCEDURE — 90472 IMMUNIZATION ADMIN EACH ADD: CPT | Mod: GA | Performed by: NURSE PRACTITIONER

## 2019-01-09 PROCEDURE — 90691 TYPHOID VACCINE IM: CPT | Mod: GA | Performed by: NURSE PRACTITIONER

## 2019-01-09 PROCEDURE — 99402 PREV MED CNSL INDIV APPRX 30: CPT | Mod: 25 | Performed by: NURSE PRACTITIONER

## 2019-01-09 PROCEDURE — 90636 HEP A/HEP B VACC ADULT IM: CPT | Mod: GA | Performed by: NURSE PRACTITIONER

## 2019-01-09 PROCEDURE — 90686 IIV4 VACC NO PRSV 0.5 ML IM: CPT | Performed by: NURSE PRACTITIONER

## 2019-01-09 RX ORDER — AZITHROMYCIN 500 MG/1
500 TABLET, FILM COATED ORAL DAILY
Qty: 3 TABLET | Refills: 0 | Status: SHIPPED | OUTPATIENT
Start: 2019-01-09 | End: 2019-01-12

## 2019-01-09 NOTE — NURSING NOTE
"Chief Complaint   Patient presents with     [facility]     [facility]      /87   Pulse 66   Temp 95.6  F (35.3  C) (Oral)   Resp 16   SpO2 98%  Estimated body mass index is 27.07 kg/m  as calculated from the following:    Height as of 2/12/18: 1.645 m (5' 4.75\").    Weight as of 2/12/18: 73.2 kg (161 lb 6.4 oz).  bp completed using cuff size: regular       Health Maintenance addressed:  NONE    n/a    Hollie Santana MA     "

## 2019-01-09 NOTE — NURSING NOTE
Screening Questionnaire for Adult Immunization    Are you sick today?   No   Do you have allergies to medications, food, a vaccine component or latex?   No   Have you ever had a serious reaction after receiving a vaccination?   No   Do you have a long-term health problem with heart disease, lung disease, asthma, kidney disease, metabolic disease (e.g. diabetes), anemia, or other blood disorder?   No   Do you have cancer, leukemia, HIV/AIDS, or any other immune system problem?   No   In the past 3 months, have you taken medications that affect  your immune system, such as prednisone, other steroids, or anticancer drugs; drugs for the treatment of rheumatoid arthritis, Crohn s disease, or psoriasis; or have you had radiation treatments?   No   Have you had a seizure, or a brain or other nervous system problem?   No   During the past year, have you received a transfusion of blood or blood     products, or been given immune (gamma) globulin or antiviral drug?   No   For women: Are you pregnant or is there a chance you could become        pregnant during the next month?   No   Have you received any vaccinations in the past 4 weeks?   No     Immunization questionnaire answers were all negative.        Per orders of JOSÉ Ayala, injection of Typhoid, FLu, MMR, and Twinrix  given by Hollie Santana. Patient instructed to remain in clinic for 15 minutes afterwards, and to report any adverse reaction to me immediately.       Screening performed by Hollie Santana on 1/9/2019 at 5:39 PM.

## 2019-01-09 NOTE — PATIENT INSTRUCTIONS
Today January 9, 2019 you received the    Flu Vaccine    Twinrix (Hepatitis A & B combo) Vaccine - Please return on 2/8/19 for your 2nd dose and 7/8/19 or later for your 3rd and final dose.    MMR (Measles Mumps Rubella) Vaccine    Typhoid - injectable. This vaccine is valid for two years.       In One month (Twin Theresa and Tdap)   .    These appointments can be made as a NURSE ONLY visit.    **It is very important for the vaccinations to be given on the scheduled day(s), this helps ensure you receive the full effectiveness of the vaccine.**    Please call [facility] with any questions [phone]    Thank you for visiting [facility]

## 2019-01-18 NOTE — PROGRESS NOTES
Nurse Note      Itinerary:  [facility]       Departure Date: 02/16/2019      Return Date: 03/02/2019      Length of Trip 2 weeks       Reason for Travel: Tourism           Urban or rural: both      Accommodations: Hotel        IMMUNIZATION HISTORY  Have you received any immunizations within the past 4 weeks?  No  Have you ever fainted from having your blood drawn or from an injection?  No  Have you ever had a fever reaction to vaccination?  No  Have you ever had any bad reaction or side effect from any vaccination?  No  Have you ever had hepatitis A or B vaccine?  No  Do you live (or work closely) with anyone who has AIDS, an AIDS-like condition, any other immune disorder or who is on chemotherapy for cancer?  No  Do you have a family history of immunodeficiency?  No  Have you received any injection of immune globulin or any blood products during the past 12 months?  No    Patient roomed by ASH Carsonsudhakar Portillo is a 58 year old female seen today with spouse for counsultation for international travel to [facility] for Tourism.  Patient will be departing in  6 week(s) and staying for   2 week(s) and  traveling with spouse.      Patient itinerary :  will be in the urban region of [facility] and south to the [facility] which presents risk for Dengue Fever, Chikungungya and food borne illnesses. exposure.      Patient's activities will include sightseeing and beach activities (salt water).    Patient's country of birth is [place]    Special medical concerns: GERD  Omeprazole  Pre-travel questionnaire was completed by patient and reviewed by provider.     Vitals: /87   Pulse 66   Temp 95.6  F (35.3  C) (Oral)   Resp 16   SpO2 98%   BMI= There is no height or weight on file to calculate BMI.    EXAM:  General:  Well-nourished, well-developed in no acute distress.  Appears to be stated age, interacts appropriately and expresses understanding of information given to patient.    Current Outpatient  Medications   Medication Sig Dispense Refill     docosanol (ABREVA) 10 % CREA Apply  topically 5 times daily. (Patient taking differently: Apply topically as needed ) 1 Tube 5     fexofenadine (ALLEGRA) 60 MG tablet Take 1 tablet by mouth 2 times daily. (Patient taking differently: Take 60 mg by mouth as needed ) 180 tablet 3     ibuprofen (ADVIL,MOTRIN) 200 MG tablet Take 1 tablet by mouth every 4 hours as needed for pain. 100 tablet 3     Patient Active Problem List   Diagnosis     Fibrocystic breast disease     Decreased libido     CARDIOVASCULAR SCREENING; LDL GOAL LESS THAN 160     Neck pain     External hemorrhoids     Shoulder joint pain     Advance care planning     Overweight     Adenomatous polyp of colon, unspecified part of colon     Allergies   Allergen Reactions     Nkda [No Known Drug Allergies]          Immunizations discussed include:   Hepatitis A:  Twin Theresa series started today  Hepatitis B: Twin Theresa series started today  Influenza: Ordered/given today, risks, benefits and side effects reviewed  Typhoid: Ordered/given today, risks, benefits and side effects reviewed  Rabies: Declined  reviewed managment of a animal bite or scratch (washing wound, seek medical care within 24 hours for post exposure prophylaxis )  Yellow Fever: Not indicated  Japanese Encephalitis: Not indicated - risk of disease is minimal off season  Meningococcus: Not indicated  Tetanus/Diphtheria: future order for 1 month  Measles/Mumps/Rubella: Ordered/given today  Cholera: Not needed  Polio: Up to date  Pneumococcal: Under age of 65  Varicella: Immune by disease history per patient report  Zostavax:  Not indicated  Shingrix: deferred  HPV:  Not indicated  TB:  none    Altitude Exposure on this trip: no  Past tolerance to Altitude: na    ASSESSMENT/PLAN:    ICD-10-CM    1. Travel advice encounter Z71.89 azithromycin (ZITHROMAX) 500 MG tablet     VACCINE ADMINISTRATION, INITIAL     VACCINE ADMINISTRATION, EACH ADDITIONAL     I  have reviewed general recommendations for safe travel   including: food/water precautions, insect precautions, safer sex   practices given high prevalence of Zika, HIV and other STDs,   roadway safety. Educational materials and Travax report provided.    Malaraia prophylaxis recommended: none  Symptomatic treatment for traveler's diarrhea: azithromycin  Altitude illness prevention and treatment: none      Evacuation insurance advised and resources were provided to patient.    Total visit time 30 minutes  with over 50% of time spent counseling patient as detailed above.    Patty Ayala CNP

## 2019-01-31 ENCOUNTER — MYC MEDICAL ADVICE (OUTPATIENT)
Dept: FAMILY MEDICINE | Facility: CLINIC | Age: 59
End: 2019-01-31

## 2019-02-08 ENCOUNTER — ALLIED HEALTH/NURSE VISIT (OUTPATIENT)
Dept: NURSING | Facility: CLINIC | Age: 59
End: 2019-02-08
Payer: COMMERCIAL

## 2019-02-08 DIAGNOSIS — Z23 NEED FOR VACCINATION: Primary | ICD-10-CM

## 2019-02-08 PROCEDURE — 90715 TDAP VACCINE 7 YRS/> IM: CPT

## 2019-02-08 PROCEDURE — 90471 IMMUNIZATION ADMIN: CPT

## 2019-02-08 PROCEDURE — 90472 IMMUNIZATION ADMIN EACH ADD: CPT

## 2019-02-08 PROCEDURE — 99207 ZZC NO CHARGE LOS: CPT

## 2019-02-08 PROCEDURE — 90636 HEP A/HEP B VACC ADULT IM: CPT

## 2019-02-08 NOTE — PROGRESS NOTES
Screening Questionnaire for Adult Immunization    Are you sick today?   No   Do you have allergies to medications, food, a vaccine component or latex?   No   Have you ever had a serious reaction after receiving a vaccination?   No   Do you have a long-term health problem with heart disease, lung disease, asthma, kidney disease, metabolic disease (e.g. diabetes), anemia, or other blood disorder?   No   Do you have cancer, leukemia, HIV/AIDS, or any other immune system problem?   No   In the past 3 months, have you taken medications that affect  your immune system, such as prednisone, other steroids, or anticancer drugs; drugs for the treatment of rheumatoid arthritis, Crohn s disease, or psoriasis; or have you had radiation treatments?   No   Have you had a seizure, or a brain or other nervous system problem?   No   During the past year, have you received a transfusion of blood or blood     products, or been given immune (gamma) globulin or antiviral drug?   No   For women: Are you pregnant or is there a chance you could become        pregnant during the next month?   No   Have you received any vaccinations in the past 4 weeks?   No     Immunization questionnaire answers were all negative.        Per orders of . Dr. Hahn, injection of Tdap and Twinrix given by Adry Pedersen. Patient instructed to remain in clinic for 15 minutes afterwards, and to report any adverse reaction to me immediately.       Screening performed by Adry Pedersen on 2/8/2019 at 1:45 PM.

## 2019-04-03 ENCOUNTER — ANCILLARY PROCEDURE (OUTPATIENT)
Dept: MAMMOGRAPHY | Facility: CLINIC | Age: 59
End: 2019-04-03
Payer: COMMERCIAL

## 2019-04-03 DIAGNOSIS — Z12.31 VISIT FOR SCREENING MAMMOGRAM: ICD-10-CM

## 2019-04-03 PROCEDURE — 77067 SCR MAMMO BI INCL CAD: CPT | Mod: TC

## 2019-09-28 ENCOUNTER — HEALTH MAINTENANCE LETTER (OUTPATIENT)
Age: 59
End: 2019-09-28

## 2019-10-16 ASSESSMENT — ENCOUNTER SYMPTOMS
HEADACHES: 0
DYSURIA: 0
HEARTBURN: 0
ARTHRALGIAS: 0
JOINT SWELLING: 0
COUGH: 0
PARESTHESIAS: 0
DIARRHEA: 0
EYE PAIN: 0
HEMATURIA: 0
MYALGIAS: 0
FREQUENCY: 0
NAUSEA: 0
FEVER: 0
PALPITATIONS: 0
SHORTNESS OF BREATH: 0
HEMATOCHEZIA: 0
CHILLS: 0
DIZZINESS: 0
BREAST MASS: 0
CONSTIPATION: 0
NERVOUS/ANXIOUS: 0
ABDOMINAL PAIN: 0
SORE THROAT: 0
WEAKNESS: 0

## 2019-10-17 ENCOUNTER — OFFICE VISIT (OUTPATIENT)
Dept: FAMILY MEDICINE | Facility: CLINIC | Age: 59
End: 2019-10-17
Payer: COMMERCIAL

## 2019-10-17 VITALS
DIASTOLIC BLOOD PRESSURE: 88 MMHG | HEART RATE: 77 BPM | BODY MASS INDEX: 24.59 KG/M2 | HEIGHT: 64 IN | SYSTOLIC BLOOD PRESSURE: 130 MMHG | TEMPERATURE: 97 F | WEIGHT: 144 LBS | OXYGEN SATURATION: 99 %

## 2019-10-17 DIAGNOSIS — E78.5 HYPERLIPIDEMIA LDL GOAL <160: ICD-10-CM

## 2019-10-17 DIAGNOSIS — Z00.00 ROUTINE GENERAL MEDICAL EXAMINATION AT A HEALTH CARE FACILITY: Primary | ICD-10-CM

## 2019-10-17 DIAGNOSIS — Z23 NEED FOR PROPHYLACTIC VACCINATION AND INOCULATION AGAINST INFLUENZA: ICD-10-CM

## 2019-10-17 LAB
CHOLEST SERPL-MCNC: 263 MG/DL
GLUCOSE SERPL-MCNC: 112 MG/DL (ref 70–99)
HDLC SERPL-MCNC: 96 MG/DL
LDLC SERPL CALC-MCNC: 156 MG/DL
NONHDLC SERPL-MCNC: 167 MG/DL
TRIGL SERPL-MCNC: 57 MG/DL

## 2019-10-17 PROCEDURE — 99396 PREV VISIT EST AGE 40-64: CPT | Mod: 25 | Performed by: NURSE PRACTITIONER

## 2019-10-17 PROCEDURE — 90471 IMMUNIZATION ADMIN: CPT | Performed by: NURSE PRACTITIONER

## 2019-10-17 PROCEDURE — 36415 COLL VENOUS BLD VENIPUNCTURE: CPT | Performed by: NURSE PRACTITIONER

## 2019-10-17 PROCEDURE — 90472 IMMUNIZATION ADMIN EACH ADD: CPT | Performed by: NURSE PRACTITIONER

## 2019-10-17 PROCEDURE — 82947 ASSAY GLUCOSE BLOOD QUANT: CPT | Performed by: NURSE PRACTITIONER

## 2019-10-17 PROCEDURE — 90686 IIV4 VACC NO PRSV 0.5 ML IM: CPT | Performed by: NURSE PRACTITIONER

## 2019-10-17 PROCEDURE — 80061 LIPID PANEL: CPT | Performed by: NURSE PRACTITIONER

## 2019-10-17 PROCEDURE — 90636 HEP A/HEP B VACC ADULT IM: CPT | Performed by: NURSE PRACTITIONER

## 2019-10-17 ASSESSMENT — ENCOUNTER SYMPTOMS
SHORTNESS OF BREATH: 0
ARTHRALGIAS: 0
EYE PAIN: 0
NAUSEA: 0
HEARTBURN: 0
DYSURIA: 0
JOINT SWELLING: 0
MYALGIAS: 0
FEVER: 0
WEAKNESS: 0
NERVOUS/ANXIOUS: 0
PALPITATIONS: 0
DIZZINESS: 0
DIARRHEA: 0
CHILLS: 0
HEMATOCHEZIA: 0
BREAST MASS: 0
HEADACHES: 0
PARESTHESIAS: 0
CONSTIPATION: 0
SORE THROAT: 0
COUGH: 0
ABDOMINAL PAIN: 0
HEMATURIA: 0
FREQUENCY: 0

## 2019-10-17 ASSESSMENT — MIFFLIN-ST. JEOR: SCORE: 1212.15

## 2019-10-17 NOTE — PROGRESS NOTES
SUBJECTIVE:   CC: Jeni Portillo is an 59 year old woman who presents for preventive health visit.     Healthy Habits:     Getting at least 3 servings of Calcium per day:  Yes    Bi-annual eye exam:  NO    Dental care twice a year:  Yes    Sleep apnea or symptoms of sleep apnea:  None    Diet:  Regular (no restrictions)    Frequency of exercise:  6-7 days/week    Duration of exercise:  45-60 minutes    Taking medications regularly:  Yes    Medication side effects:  None    PHQ-2 Total Score: 0    Additional concerns today:  No        Today's PHQ-2 Score:   PHQ-2 ( 1999 Pfizer) 10/16/2019   Q1: Little interest or pleasure in doing things 0   Q2: Feeling down, depressed or hopeless 0   PHQ-2 Score 0   Q1: Little interest or pleasure in doing things Not at all   Q2: Feeling down, depressed or hopeless Not at all   PHQ-2 Score 0       Abuse: Current or Past(Physical, Sexual or Emotional)- No  Do you feel safe in your environment? Yes    Social History     Tobacco Use     Smoking status: Never Smoker     Smokeless tobacco: Never Used   Substance Use Topics     Alcohol use: Yes     Alcohol/week: 0.0 standard drinks     Comment: 3 drinks per week      If you drink alcohol do you typically have >3 drinks per day or >7 drinks per week? No    Alcohol Use 10/16/2019   Prescreen: >3 drinks/day or >7 drinks/week? No   Prescreen: >3 drinks/day or >7 drinks/week? -       Reviewed orders with patient.  Reviewed health maintenance and updated orders accordingly - Yes  Labs reviewed in [facility]    Mammogram Screening: Patient over age 50, mutual decision to screen reflected in health maintenance.    Pertinent mammograms are reviewed under the imaging tab.  History of abnormal Pap smear: NO - age 30-65 PAP every 5 years with negative HPV co-testing recommended  PAP / HPV Latest Ref Rng & Units 2/12/2018 10/24/2014 10/21/2011   PAP - NIL NIL NIL   HPV 16 DNA NEG:Negative Negative - -   HPV 18 DNA NEG:Negative Negative - -   OTHER HR HPV  "NEG:Negative Negative - -     Reviewed and updated as needed this visit by clinical staff  Tobacco  Allergies  Meds         Reviewed and updated as needed this visit by Provider            Review of Systems   Constitutional: Negative for chills and fever.   HENT: Negative for congestion, ear pain, hearing loss and sore throat.    Eyes: Negative for pain and visual disturbance.   Respiratory: Negative for cough and shortness of breath.    Cardiovascular: Negative for chest pain, palpitations and peripheral edema.   Gastrointestinal: Negative for abdominal pain, constipation, diarrhea, heartburn, hematochezia and nausea.   Breasts:  Negative for tenderness, breast mass and discharge.   Genitourinary: Negative for dysuria, frequency, genital sores, hematuria, pelvic pain, urgency, vaginal bleeding and vaginal discharge.   Musculoskeletal: Negative for arthralgias, joint swelling and myalgias.   Skin: Negative for rash.   Neurological: Negative for dizziness, weakness, headaches and paresthesias.   Psychiatric/Behavioral: Negative for mood changes. The patient is not nervous/anxious.           OBJECTIVE:   /88 (BP Location: Left arm, Patient Position: Chair, Cuff Size: Adult Regular)   Pulse 77   Temp 97  F (36.1  C) (Oral)   Ht 1.632 m (5' 4.25\")   Wt 65.3 kg (144 lb)   SpO2 99%   BMI 24.53 kg/m    Physical Exam  GENERAL: healthy, alert and no distress  EYES: Eyes grossly normal to inspection, PERRL and conjunctivae and sclerae normal  HENT: ear canals and TM's normal, nose and mouth without ulcers or lesions  NECK: no adenopathy, no asymmetry, masses, or scars and thyroid normal to palpation  RESP: lungs clear to auscultation - no rales, rhonchi or wheezes  BREAST: normal without masses, tenderness or nipple discharge and no palpable axillary masses or adenopathy  CV: regular rate and rhythm, normal S1 S2, no S3 or S4, no murmur, click or rub, no peripheral edema and peripheral pulses strong  ABDOMEN: " "soft, nontender, no hepatosplenomegaly, no masses and bowel sounds normal  MS: no gross musculoskeletal defects noted, no edema  SKIN: no suspicious lesions or rashes  NEURO: Normal strength and tone, mentation intact and speech normal  PSYCH: mentation appears normal, affect normal/bright    Diagnostic Test Results:  Labs reviewed in Epic  No results found for this or any previous visit (from the past 24 hour(s)).    ASSESSMENT/PLAN:   1. Routine general medical examination at a health care facility      2. Hyperlipidemia LDL goal <160  Repeat labs today, diet/exercise counseling. Has very healthy lifestyle so if  or higher, will give 3 month trial of lifestyle interventions but anticipate need of statin.  - Lipid panel reflex to direct LDL Non-fasting  - Glucose    3. Need for prophylactic vaccination and inoculation against influenza    - INFLUENZA VACCINE IM > 6 MONTHS VALENT IIV4 [01737]  - Vaccine Administration, Initial [80341]    COUNSELING:  Reviewed preventive health counseling, as reflected in patient instructions       Regular exercise       Healthy diet/nutrition       Immunizations    Vaccinated for: Hepatitis A, Hepatitis B, Influenza and Zoster             Colon cancer screening    Estimated body mass index is 24.53 kg/m  as calculated from the following:    Height as of this encounter: 1.632 m (5' 4.25\").    Weight as of this encounter: 65.3 kg (144 lb).         reports that she has never smoked. She has never used smokeless tobacco.      Counseling Resources:  ATP IV Guidelines  Pooled Cohorts Equation Calculator  Breast Cancer Risk Calculator  FRAX Risk Assessment  ICSI Preventive Guidelines  Dietary Guidelines for Americans, 2010  USDA's MyPlate  ASA Prophylaxis  Lung CA Screening    AUDRA Shi [facility]  "

## 2020-03-25 DIAGNOSIS — K21.9 GASTROESOPHAGEAL REFLUX DISEASE, ESOPHAGITIS PRESENCE NOT SPECIFIED: ICD-10-CM

## 2020-03-25 NOTE — TELEPHONE ENCOUNTER
Dr. Cm had refilled this reported med in December while covering for Patty Duffy CNP     Will route refill request to Patty Duffy CNP this time    Bakari Cochran RN

## 2020-03-25 NOTE — TELEPHONE ENCOUNTER
Reason for Call:  Other prescription    Detailed comments: omeprazole (PRILOSEC) 20 MG DR capsule     Please send to [facility] in [place], #[id number]    Phone Number Patient can be reached at: Cell number on file:    Telephone Information:   Mobile [phone]       Best Time: any    Can we leave a detailed message on this number? YES    Call taken on 3/25/2020 at 3:01 PM by Gen Miner

## 2020-06-17 DIAGNOSIS — K21.9 GASTROESOPHAGEAL REFLUX DISEASE, ESOPHAGITIS PRESENCE NOT SPECIFIED: ICD-10-CM

## 2020-10-12 NOTE — PROGRESS NOTES
"   SUBJECTIVE:   CC: Jeni Portillo is an 60 year old woman who presents for preventive health visit.     {Split Bill scripting  The purpose of this visit is to discuss your medical history and prevent health problems before you are sick. You may be responsible for a co-pay, coinsurance, or deductible if your visit today includes services such as checking on a sore throat, having an x-ray or lab test, or treating and evaluating a new or existing condition :484419}  Patient has been advised of split billing requirements and indicates understanding: {Yes and No:979106}  Healthy Habits:    Do you get at least three servings of calcium containing foods daily (dairy, green leafy vegetables, etc.)? { :579140::\"yes\"}    Amount of exercise or daily activities, outside of work: { :594169}    Problems taking medications regularly { :085017::\"No\"}    Medication side effects: { :484844::\"No\"}    Have you had an eye exam in the past two years? { :073065}    Do you see a dentist twice per year? { :122801}    Do you have sleep apnea, excessive snoring or daytime drowsiness?{ :371626}  {Outside tests to abstract? :228457}    {additional problems to add (Optional):359936}    Today's PHQ-2 Score:   PHQ-2 ( 1999 Pfizer) 10/16/2019 2/9/2018   Q1: Little interest or pleasure in doing things 0 0   Q2: Feeling down, depressed or hopeless 0 0   PHQ-2 Score 0 0   Q1: Little interest or pleasure in doing things Not at all Not at all   Q2: Feeling down, depressed or hopeless Not at all Not at all   PHQ-2 Score 0 0     {PHQ-2 LOOK IN ASSESSMENTS (Optional) :630593}  Abuse: Current or Past(Physical, Sexual or Emotional)- {YES/NO/NA:869521}  Do you feel safe in your environment? {YES/NO/NA:242813}        Social History     Tobacco Use     Smoking status: Never Smoker     Smokeless tobacco: Never Used   Substance Use Topics     Alcohol use: Yes     Alcohol/week: 0.0 standard drinks     Comment: 3 drinks per week      If you drink alcohol do you " "typically have >3 drinks per day or >7 drinks per week? {ETOH :149514}                     Reviewed orders with patient.  Reviewed health maintenance and updated orders accordingly - {Yes/No:527750::\"Yes\"}  {Chronicprobdata (Optional):579036}    {Mammo Decision Support (Optional):335949}    Pertinent mammograms are reviewed under the imaging tab.  History of abnormal Pap smear: {PAP HX:464394}  PAP / HPV Latest Ref Rng & Units 2/12/2018 10/24/2014 10/21/2011   PAP - NIL NIL NIL   HPV 16 DNA NEG:Negative Negative - -   HPV 18 DNA NEG:Negative Negative - -   OTHER HR HPV NEG:Negative Negative - -     Reviewed and updated as needed this visit by clinical staff                 Reviewed and updated as needed this visit by Provider                {HISTORY OPTIONS (Optional):265092}    ROS:  { :236764}    OBJECTIVE:   There were no vitals taken for this visit.  EXAM:  {Exam Choices:235451}    {Diagnostic Test Results (Optional):045746::\"Diagnostic Test Results:\",\"Labs reviewed in Epic\"}    ASSESSMENT/PLAN:   {Diag Picklist:451658}    Patient has been advised of split billing requirements and indicates understanding: {YES / NO:815096::\"Yes\"}  COUNSELING:   {FEMALE COUNSELING MESSAGES:499812::\"Reviewed preventive health counseling, as reflected in patient instructions\"}    Estimated body mass index is 24.53 kg/m  as calculated from the following:    Height as of 10/17/19: 1.632 m (5' 4.25\").    Weight as of 10/17/19: 65.3 kg (144 lb).    {Weight Management Plan (ACO) Complete if BMI is abnormal-  Ages 18-64  BMI >24.9.  Age 65+ with BMI <23 or >30 (Optional):439112}    She reports that she has never smoked. She has never used smokeless tobacco.      Counseling Resources:  ATP IV Guidelines  Pooled Cohorts Equation Calculator  Breast Cancer Risk Calculator  BRCA-Related Cancer Risk Assessment: FHS-7 Tool  FRAX Risk Assessment  ICSI Preventive Guidelines  Dietary Guidelines for Americans, 2010  USDA's MyPlate  ASA " Prophylaxis  Lung CA Screening    Patty Duffy, AUDRA CNP  [facility]

## 2020-10-20 ASSESSMENT — ENCOUNTER SYMPTOMS
HEMATOCHEZIA: 0
JOINT SWELLING: 0
ARTHRALGIAS: 0
BREAST MASS: 0
FEVER: 0
NERVOUS/ANXIOUS: 0
HEARTBURN: 0
PARESTHESIAS: 0
HEMATURIA: 0
DIZZINESS: 0
COUGH: 0
FREQUENCY: 0
PALPITATIONS: 0
SHORTNESS OF BREATH: 0
SORE THROAT: 0
CHILLS: 0
ABDOMINAL PAIN: 0
DYSURIA: 0
WEAKNESS: 0
DIARRHEA: 0
NAUSEA: 0
CONSTIPATION: 0
EYE PAIN: 0
MYALGIAS: 0
HEADACHES: 0

## 2020-10-21 ENCOUNTER — OFFICE VISIT (OUTPATIENT)
Dept: FAMILY MEDICINE | Facility: CLINIC | Age: 60
End: 2020-10-21
Payer: COMMERCIAL

## 2020-10-21 VITALS
BODY MASS INDEX: 22.99 KG/M2 | HEART RATE: 78 BPM | WEIGHT: 138 LBS | TEMPERATURE: 98.1 F | DIASTOLIC BLOOD PRESSURE: 82 MMHG | HEIGHT: 65 IN | SYSTOLIC BLOOD PRESSURE: 124 MMHG | OXYGEN SATURATION: 98 %

## 2020-10-21 DIAGNOSIS — D12.6 ADENOMATOUS POLYP OF COLON, UNSPECIFIED PART OF COLON: ICD-10-CM

## 2020-10-21 DIAGNOSIS — Z13.6 CARDIOVASCULAR SCREENING; LDL GOAL LESS THAN 160: ICD-10-CM

## 2020-10-21 DIAGNOSIS — K22.2 ESOPHAGEAL STRICTURE: ICD-10-CM

## 2020-10-21 DIAGNOSIS — Z12.31 ENCOUNTER FOR SCREENING MAMMOGRAM FOR BREAST CANCER: ICD-10-CM

## 2020-10-21 DIAGNOSIS — Z00.00 ROUTINE GENERAL MEDICAL EXAMINATION AT A HEALTH CARE FACILITY: Primary | ICD-10-CM

## 2020-10-21 DIAGNOSIS — Z23 NEED FOR PROPHYLACTIC VACCINATION AND INOCULATION AGAINST INFLUENZA: ICD-10-CM

## 2020-10-21 LAB
CHOLEST SERPL-MCNC: 304 MG/DL
GLUCOSE SERPL-MCNC: 87 MG/DL (ref 70–99)
HDLC SERPL-MCNC: 126 MG/DL
LDLC SERPL CALC-MCNC: 158 MG/DL
NONHDLC SERPL-MCNC: 178 MG/DL
TRIGL SERPL-MCNC: 98 MG/DL

## 2020-10-21 PROCEDURE — 36415 COLL VENOUS BLD VENIPUNCTURE: CPT | Performed by: NURSE PRACTITIONER

## 2020-10-21 PROCEDURE — 99396 PREV VISIT EST AGE 40-64: CPT | Mod: 25 | Performed by: NURSE PRACTITIONER

## 2020-10-21 PROCEDURE — 82947 ASSAY GLUCOSE BLOOD QUANT: CPT | Performed by: NURSE PRACTITIONER

## 2020-10-21 PROCEDURE — 90682 RIV4 VACC RECOMBINANT DNA IM: CPT | Performed by: NURSE PRACTITIONER

## 2020-10-21 PROCEDURE — 90750 HZV VACC RECOMBINANT IM: CPT | Performed by: NURSE PRACTITIONER

## 2020-10-21 PROCEDURE — 90471 IMMUNIZATION ADMIN: CPT | Performed by: NURSE PRACTITIONER

## 2020-10-21 PROCEDURE — 90472 IMMUNIZATION ADMIN EACH ADD: CPT | Performed by: NURSE PRACTITIONER

## 2020-10-21 PROCEDURE — 80061 LIPID PANEL: CPT | Performed by: NURSE PRACTITIONER

## 2020-10-21 ASSESSMENT — ENCOUNTER SYMPTOMS
PALPITATIONS: 0
ABDOMINAL PAIN: 0
SHORTNESS OF BREATH: 0
FREQUENCY: 0
PARESTHESIAS: 0
WEAKNESS: 0
CHILLS: 0
DYSURIA: 0
ARTHRALGIAS: 0
JOINT SWELLING: 0
BREAST MASS: 0
DIZZINESS: 0
COUGH: 0
SORE THROAT: 0
NAUSEA: 0
HEMATOCHEZIA: 0
EYE PAIN: 0
HEARTBURN: 0
CONSTIPATION: 0
HEADACHES: 0
HEMATURIA: 0
FEVER: 0
NERVOUS/ANXIOUS: 0
MYALGIAS: 0
DIARRHEA: 0

## 2020-10-21 ASSESSMENT — MIFFLIN-ST. JEOR: SCORE: 1187.87

## 2020-10-21 NOTE — PROGRESS NOTES
SUBJECTIVE:   CC: Jeni Portillo is an 60 year old woman who presents for preventive health visit.       Patient has been advised of split billing requirements and indicates understanding: Yes  Healthy Habits:     Getting at least 3 servings of Calcium per day:  Yes    Bi-annual eye exam:  Yes    Dental care twice a year:  Yes    Sleep apnea or symptoms of sleep apnea:  None    Diet:  Regular (no restrictions)    Frequency of exercise:  6-7 days/week    Duration of exercise:  30-45 minutes    Taking medications regularly:  Yes    Medication side effects:  None    PHQ-2 Total Score: 0    Additional concerns today:  No  Imm/Inj  Pertinent negatives include no abdominal pain, arthralgias, chest pain, chills, congestion, coughing, fever, headaches, joint swelling, myalgias, nausea, rash, sore throat or weakness.         Today's PHQ-2 Score:   PHQ-2 ( 1999 Pfizer) 10/20/2020   Q1: Little interest or pleasure in doing things 0   Q2: Feeling down, depressed or hopeless 0   PHQ-2 Score 0   Q1: Little interest or pleasure in doing things Not at all   Q2: Feeling down, depressed or hopeless Not at all   PHQ-2 Score 0       Abuse: Current or Past (Physical, Sexual or Emotional) - No  Do you feel safe in your environment? Yes        Social History     Tobacco Use     Smoking status: Never Smoker     Smokeless tobacco: Never Used   Substance Use Topics     Alcohol use: Yes     Alcohol/week: 0.0 standard drinks     Comment: 3 drinks per week      If you drink alcohol do you typically have >3 drinks per day or >7 drinks per week? No    Alcohol Use 10/20/2020   Prescreen: >3 drinks/day or >7 drinks/week? No   Prescreen: >3 drinks/day or >7 drinks/week? -       Reviewed orders with patient.  Reviewed health maintenance and updated orders accordingly - Yes  Labs reviewed in [facility]    Mammogram Screening: Patient over age 50, mutual decision to screen reflected in health maintenance.    Pertinent mammograms are reviewed under the  "imaging tab.  History of abnormal Pap smear: NO - age 30-65 PAP every 5 years with negative HPV co-testing recommended  PAP / HPV Latest Ref Rng & Units 2/12/2018 10/24/2014 10/21/2011   PAP - NIL NIL NIL   HPV 16 DNA NEG:Negative Negative - -   HPV 18 DNA NEG:Negative Negative - -   OTHER HR HPV NEG:Negative Negative - -     Reviewed and updated as needed this visit by clinical staff  Tobacco  Allergies  Meds              Reviewed and updated as needed this visit by Provider                    Review of Systems   Constitutional: Negative for chills and fever.   HENT: Negative for congestion, ear pain, hearing loss and sore throat.    Eyes: Negative for pain and visual disturbance.   Respiratory: Negative for cough and shortness of breath.    Cardiovascular: Negative for chest pain, palpitations and peripheral edema.   Gastrointestinal: Negative for abdominal pain, constipation, diarrhea, heartburn, hematochezia and nausea.   Breasts:  Negative for tenderness, breast mass and discharge.   Genitourinary: Negative for dysuria, frequency, genital sores, hematuria, pelvic pain, urgency, vaginal bleeding and vaginal discharge.   Musculoskeletal: Negative for arthralgias, joint swelling and myalgias.   Skin: Negative for rash.   Neurological: Negative for dizziness, weakness, headaches and paresthesias.   Psychiatric/Behavioral: Negative for mood changes. The patient is not nervous/anxious.           OBJECTIVE:   /82 (BP Location: Right arm, Patient Position: Chair, Cuff Size: Adult Regular)   Pulse 78   Temp 98.1  F (36.7  C) (Oral)   Ht 1.645 m (5' 4.75\")   Wt 62.6 kg (138 lb)   SpO2 98%   BMI 23.14 kg/m    Physical Exam  GENERAL: healthy, alert and no distress  EYES: Eyes grossly normal to inspection, PERRL and conjunctivae and sclerae normal  HENT: ear canals and TM's normal, nose and mouth without ulcers or lesions  NECK: no adenopathy, no asymmetry, masses, or scars and thyroid normal to " palpation  RESP: lungs clear to auscultation - no rales, rhonchi or wheezes  CV: regular rate and rhythm, normal S1 S2, no S3 or S4, no murmur, click or rub, no peripheral edema and peripheral pulses strong  ABDOMEN: soft, nontender, no hepatosplenomegaly, no masses and bowel sounds normal  MS: no gross musculoskeletal defects noted, no edema  SKIN: no suspicious lesions or rashes  NEURO: Normal strength and tone, mentation intact and speech normal  PSYCH: mentation appears normal, affect normal/bright    Diagnostic Test Results:  Labs reviewed in Epic  Results for orders placed or performed in visit on 10/21/20 (from the past 24 hour(s))   Lipid panel reflex to direct LDL Non-fasting   Result Value Ref Range    Cholesterol 304 (H) <200 mg/dL    Triglycerides 98 <150 mg/dL    HDL Cholesterol 126 >49 mg/dL    LDL Cholesterol Calculated 158 (H) <100 mg/dL    Non HDL Cholesterol 178 (H) <130 mg/dL   Glucose   Result Value Ref Range    Glucose 87 70 - 99 mg/dL       ASSESSMENT/PLAN:   1. Routine general medical examination at a health care facility      2. Need for prophylactic vaccination and inoculation against influenza    - INFLUENZA QUAD, RECOMBINANT, P-FREE (RIV4) (FLUBLOCK) [81878]  - Vaccine Administration, Initial [68504]    3. CARDIOVASCULAR SCREENING; LDL GOAL LESS THAN 160    - Lipid panel reflex to direct LDL Non-fasting  - Glucose    4. Encounter for screening mammogram for breast cancer    - *MA Screening Digital Bilateral; Future    5. Adenomatous polyp of colon, unspecified part of colon  Due for screening in 6  months  - GASTROENTEROLOGY ADULT REF PROCEDURE ONLY; Future    6. Esophageal stricture  Medications refilled  - omeprazole (PRILOSEC) 20 MG DR capsule; TAKE 1 CAPSULE BY MOUTH EVERY DAY  Dispense: 90 capsule; Refill: 3      COUNSELING:  Reviewed preventive health counseling, as reflected in patient instructions       Regular exercise       Healthy diet/nutrition       Immunizations    Vaccinated  "for: Influenza and Zoster             Osteoporosis Prevention/Bone Health       Colon cancer screening    Estimated body mass index is 23.14 kg/m  as calculated from the following:    Height as of this encounter: 1.645 m (5' 4.75\").    Weight as of this encounter: 62.6 kg (138 lb).        She reports that she has never smoked. She has never used smokeless tobacco.      Counseling Resources:  ATP IV Guidelines  Pooled Cohorts Equation Calculator  Breast Cancer Risk Calculator  BRCA-Related Cancer Risk Assessment: FHS-7 Tool  FRAX Risk Assessment  ICSI Preventive Guidelines  Dietary Guidelines for Americans, 2010  USDA's MyPlate  ASA Prophylaxis  Lung CA Screening    Patty Duffy, AUDRA [facility]  "

## 2021-03-13 ENCOUNTER — HEALTH MAINTENANCE LETTER (OUTPATIENT)
Age: 61
End: 2021-03-13

## 2021-05-17 ENCOUNTER — ANCILLARY PROCEDURE (OUTPATIENT)
Dept: MAMMOGRAPHY | Facility: CLINIC | Age: 61
End: 2021-05-17
Attending: NURSE PRACTITIONER
Payer: COMMERCIAL

## 2021-05-17 DIAGNOSIS — Z12.31 ENCOUNTER FOR SCREENING MAMMOGRAM FOR BREAST CANCER: ICD-10-CM

## 2021-05-17 PROCEDURE — 77067 SCR MAMMO BI INCL CAD: CPT | Mod: TC | Performed by: RADIOLOGY

## 2021-06-23 ENCOUNTER — TRANSFERRED RECORDS (OUTPATIENT)
Dept: HEALTH INFORMATION MANAGEMENT | Facility: CLINIC | Age: 61
End: 2021-06-23

## 2021-10-23 ENCOUNTER — HEALTH MAINTENANCE LETTER (OUTPATIENT)
Age: 61
End: 2021-10-23

## 2021-11-09 SDOH — ECONOMIC STABILITY: FOOD INSECURITY: WITHIN THE PAST 12 MONTHS, THE FOOD YOU BOUGHT JUST DIDN'T LAST AND YOU DIDN'T HAVE MONEY TO GET MORE.: NEVER TRUE

## 2021-11-09 SDOH — ECONOMIC STABILITY: FOOD INSECURITY: WITHIN THE PAST 12 MONTHS, YOU WORRIED THAT YOUR FOOD WOULD RUN OUT BEFORE YOU GOT MONEY TO BUY MORE.: NEVER TRUE

## 2021-11-09 SDOH — ECONOMIC STABILITY: TRANSPORTATION INSECURITY
IN THE PAST 12 MONTHS, HAS LACK OF TRANSPORTATION KEPT YOU FROM MEETINGS, WORK, OR FROM GETTING THINGS NEEDED FOR DAILY LIVING?: NO

## 2021-11-09 SDOH — ECONOMIC STABILITY: TRANSPORTATION INSECURITY
IN THE PAST 12 MONTHS, HAS THE LACK OF TRANSPORTATION KEPT YOU FROM MEDICAL APPOINTMENTS OR FROM GETTING MEDICATIONS?: YES

## 2021-11-09 SDOH — HEALTH STABILITY: PHYSICAL HEALTH: ON AVERAGE, HOW MANY MINUTES DO YOU ENGAGE IN EXERCISE AT THIS LEVEL?: 30 MIN

## 2021-11-09 SDOH — ECONOMIC STABILITY: INCOME INSECURITY: HOW HARD IS IT FOR YOU TO PAY FOR THE VERY BASICS LIKE FOOD, HOUSING, MEDICAL CARE, AND HEATING?: NOT HARD AT ALL

## 2021-11-09 SDOH — HEALTH STABILITY: PHYSICAL HEALTH: ON AVERAGE, HOW MANY DAYS PER WEEK DO YOU ENGAGE IN MODERATE TO STRENUOUS EXERCISE (LIKE A BRISK WALK)?: 5 DAYS

## 2021-11-09 SDOH — ECONOMIC STABILITY: INCOME INSECURITY: IN THE LAST 12 MONTHS, WAS THERE A TIME WHEN YOU WERE NOT ABLE TO PAY THE MORTGAGE OR RENT ON TIME?: NO

## 2021-11-09 ASSESSMENT — ENCOUNTER SYMPTOMS
MYALGIAS: 0
ABDOMINAL PAIN: 0
PALPITATIONS: 0
DYSURIA: 0
SORE THROAT: 0
CONSTIPATION: 0
DIARRHEA: 0
BREAST MASS: 0
PARESTHESIAS: 0
JOINT SWELLING: 0
EYE PAIN: 0
FEVER: 0
SHORTNESS OF BREATH: 0
CHILLS: 0
FREQUENCY: 0
HEARTBURN: 0
NAUSEA: 0
ARTHRALGIAS: 0
HEMATURIA: 0
COUGH: 0
WEAKNESS: 0
DIZZINESS: 0
HEMATOCHEZIA: 0
NERVOUS/ANXIOUS: 0
HEADACHES: 0

## 2021-11-09 ASSESSMENT — SOCIAL DETERMINANTS OF HEALTH (SDOH)
HOW OFTEN DO YOU GET TOGETHER WITH FRIENDS OR RELATIVES?: ONCE A WEEK
DO YOU BELONG TO ANY CLUBS OR ORGANIZATIONS SUCH AS CHURCH GROUPS UNIONS, FRATERNAL OR ATHLETIC GROUPS, OR SCHOOL GROUPS?: YES
IN A TYPICAL WEEK, HOW MANY TIMES DO YOU TALK ON THE PHONE WITH FAMILY, FRIENDS, OR NEIGHBORS?: MORE THAN THREE TIMES A WEEK
HOW OFTEN DO YOU ATTEND CHURCH OR RELIGIOUS SERVICES?: NEVER

## 2021-11-09 ASSESSMENT — LIFESTYLE VARIABLES
HOW MANY STANDARD DRINKS CONTAINING ALCOHOL DO YOU HAVE ON A TYPICAL DAY: 1 OR 2
HOW OFTEN DO YOU HAVE SIX OR MORE DRINKS ON ONE OCCASION: NEVER
HOW OFTEN DO YOU HAVE A DRINK CONTAINING ALCOHOL: 2-3 TIMES A WEEK

## 2021-11-10 ENCOUNTER — OFFICE VISIT (OUTPATIENT)
Dept: FAMILY MEDICINE | Facility: CLINIC | Age: 61
End: 2021-11-10
Payer: COMMERCIAL

## 2021-11-10 VITALS
TEMPERATURE: 98.3 F | HEIGHT: 65 IN | DIASTOLIC BLOOD PRESSURE: 80 MMHG | SYSTOLIC BLOOD PRESSURE: 132 MMHG | HEART RATE: 100 BPM | WEIGHT: 138 LBS | BODY MASS INDEX: 22.99 KG/M2 | OXYGEN SATURATION: 98 %

## 2021-11-10 DIAGNOSIS — K22.2 ESOPHAGEAL STRICTURE: ICD-10-CM

## 2021-11-10 DIAGNOSIS — Z00.00 ROUTINE GENERAL MEDICAL EXAMINATION AT A HEALTH CARE FACILITY: Primary | ICD-10-CM

## 2021-11-10 DIAGNOSIS — Z80.3 FAMILY HISTORY OF MALIGNANT NEOPLASM OF BREAST: ICD-10-CM

## 2021-11-10 LAB
ANION GAP SERPL CALCULATED.3IONS-SCNC: 5 MMOL/L (ref 3–14)
BUN SERPL-MCNC: 14 MG/DL (ref 7–30)
CALCIUM SERPL-MCNC: 9.7 MG/DL (ref 8.5–10.1)
CHLORIDE BLD-SCNC: 102 MMOL/L (ref 94–109)
CHOLEST SERPL-MCNC: 276 MG/DL
CO2 SERPL-SCNC: 28 MMOL/L (ref 20–32)
CREAT SERPL-MCNC: 0.72 MG/DL (ref 0.52–1.04)
FASTING STATUS PATIENT QL REPORTED: NO
GFR SERPL CREATININE-BSD FRML MDRD: 90 ML/MIN/1.73M2
GLUCOSE BLD-MCNC: 93 MG/DL (ref 70–99)
HDLC SERPL-MCNC: 109 MG/DL
HGB BLD-MCNC: 13.5 G/DL (ref 11.7–15.7)
LDLC SERPL CALC-MCNC: 147 MG/DL
NONHDLC SERPL-MCNC: 167 MG/DL
POTASSIUM BLD-SCNC: 3.9 MMOL/L (ref 3.4–5.3)
SODIUM SERPL-SCNC: 135 MMOL/L (ref 133–144)
TRIGL SERPL-MCNC: 99 MG/DL

## 2021-11-10 PROCEDURE — 80048 BASIC METABOLIC PNL TOTAL CA: CPT | Performed by: NURSE PRACTITIONER

## 2021-11-10 PROCEDURE — 90471 IMMUNIZATION ADMIN: CPT | Performed by: NURSE PRACTITIONER

## 2021-11-10 PROCEDURE — 99396 PREV VISIT EST AGE 40-64: CPT | Mod: 25 | Performed by: NURSE PRACTITIONER

## 2021-11-10 PROCEDURE — 80061 LIPID PANEL: CPT | Performed by: NURSE PRACTITIONER

## 2021-11-10 PROCEDURE — 36415 COLL VENOUS BLD VENIPUNCTURE: CPT | Performed by: NURSE PRACTITIONER

## 2021-11-10 PROCEDURE — 90682 RIV4 VACC RECOMBINANT DNA IM: CPT | Performed by: NURSE PRACTITIONER

## 2021-11-10 PROCEDURE — 85018 HEMOGLOBIN: CPT | Performed by: NURSE PRACTITIONER

## 2021-11-10 ASSESSMENT — ENCOUNTER SYMPTOMS
ABDOMINAL PAIN: 0
SHORTNESS OF BREATH: 0
ARTHRALGIAS: 0
HEMATURIA: 0
FEVER: 0
MYALGIAS: 0
DIARRHEA: 0
HEARTBURN: 0
PARESTHESIAS: 0
NAUSEA: 0
HEADACHES: 0
PALPITATIONS: 0
DIZZINESS: 0
CHILLS: 0
NERVOUS/ANXIOUS: 0
DYSURIA: 0
CONSTIPATION: 0
EYE PAIN: 0
BREAST MASS: 0
HEMATOCHEZIA: 0
FREQUENCY: 0
JOINT SWELLING: 0
SORE THROAT: 0
COUGH: 0
WEAKNESS: 0

## 2021-11-10 ASSESSMENT — MIFFLIN-ST. JEOR: SCORE: 1178.9

## 2021-11-12 ENCOUNTER — DOCUMENTATION ONLY (OUTPATIENT)
Dept: OTHER | Facility: CLINIC | Age: 61
End: 2021-11-12
Payer: COMMERCIAL

## 2022-02-17 PROBLEM — Z71.89 ADVANCE CARE PLANNING: Chronic | Status: RESOLVED | Noted: 2017-01-04 | Resolved: 2021-11-12

## 2022-06-16 ENCOUNTER — ANCILLARY PROCEDURE (OUTPATIENT)
Dept: MAMMOGRAPHY | Facility: CLINIC | Age: 62
End: 2022-06-16
Attending: NURSE PRACTITIONER
Payer: COMMERCIAL

## 2022-06-16 DIAGNOSIS — Z12.31 VISIT FOR SCREENING MAMMOGRAM: ICD-10-CM

## 2022-06-16 PROCEDURE — 77067 SCR MAMMO BI INCL CAD: CPT | Mod: TC | Performed by: RADIOLOGY

## 2022-10-10 ENCOUNTER — HEALTH MAINTENANCE LETTER (OUTPATIENT)
Age: 62
End: 2022-10-10

## 2022-11-14 ENCOUNTER — OFFICE VISIT (OUTPATIENT)
Dept: FAMILY MEDICINE | Facility: CLINIC | Age: 62
End: 2022-11-14
Payer: COMMERCIAL

## 2022-11-14 VITALS
WEIGHT: 139.6 LBS | BODY MASS INDEX: 23.83 KG/M2 | SYSTOLIC BLOOD PRESSURE: 133 MMHG | OXYGEN SATURATION: 97 % | DIASTOLIC BLOOD PRESSURE: 77 MMHG | HEART RATE: 87 BPM | HEIGHT: 64 IN | TEMPERATURE: 98.1 F

## 2022-11-14 DIAGNOSIS — K22.2 ESOPHAGEAL STRICTURE: ICD-10-CM

## 2022-11-14 DIAGNOSIS — Z23 HIGH PRIORITY FOR 2019-NCOV VACCINE: ICD-10-CM

## 2022-11-14 DIAGNOSIS — Z12.4 CERVICAL CANCER SCREENING: ICD-10-CM

## 2022-11-14 DIAGNOSIS — Z00.00 ROUTINE GENERAL MEDICAL EXAMINATION AT A HEALTH CARE FACILITY: Primary | ICD-10-CM

## 2022-11-14 PROCEDURE — 80061 LIPID PANEL: CPT | Performed by: PHYSICIAN ASSISTANT

## 2022-11-14 PROCEDURE — 36415 COLL VENOUS BLD VENIPUNCTURE: CPT | Performed by: PHYSICIAN ASSISTANT

## 2022-11-14 PROCEDURE — 90682 RIV4 VACC RECOMBINANT DNA IM: CPT | Performed by: PHYSICIAN ASSISTANT

## 2022-11-14 PROCEDURE — 90471 IMMUNIZATION ADMIN: CPT | Performed by: PHYSICIAN ASSISTANT

## 2022-11-14 PROCEDURE — 87624 HPV HI-RISK TYP POOLED RSLT: CPT | Performed by: PHYSICIAN ASSISTANT

## 2022-11-14 PROCEDURE — G0145 SCR C/V CYTO,THINLAYER,RESCR: HCPCS | Performed by: PHYSICIAN ASSISTANT

## 2022-11-14 PROCEDURE — 0124A COVID-19,PF,PFIZER BOOSTER BIVALENT: CPT | Performed by: PHYSICIAN ASSISTANT

## 2022-11-14 PROCEDURE — 91312 COVID-19,PF,PFIZER BOOSTER BIVALENT: CPT | Performed by: PHYSICIAN ASSISTANT

## 2022-11-14 PROCEDURE — 99396 PREV VISIT EST AGE 40-64: CPT | Mod: 25 | Performed by: PHYSICIAN ASSISTANT

## 2022-11-14 ASSESSMENT — ENCOUNTER SYMPTOMS
HEMATURIA: 0
SORE THROAT: 0
HEADACHES: 0
HEMATOCHEZIA: 0
BREAST MASS: 0
CHILLS: 0
NAUSEA: 0
HEARTBURN: 0
DYSURIA: 0
COUGH: 0
JOINT SWELLING: 0
CONSTIPATION: 0
DIZZINESS: 0
PALPITATIONS: 0
DIARRHEA: 0
NERVOUS/ANXIOUS: 0
ABDOMINAL PAIN: 0
MYALGIAS: 0
EYE PAIN: 0
WEAKNESS: 0
FEVER: 0
FREQUENCY: 0
SHORTNESS OF BREATH: 0
PARESTHESIAS: 0
ARTHRALGIAS: 0

## 2022-11-14 NOTE — PROGRESS NOTES
SUBJECTIVE:   CC: Shavon is an 62 year old who presents for preventive health visit.       Patient has been advised of split billing requirements and indicates understanding: Yes  Healthy Habits:     Getting at least 3 servings of Calcium per day:  Yes    Bi-annual eye exam:  Yes    Dental care twice a year:  Yes    Sleep apnea or symptoms of sleep apnea:  None    Diet:  Regular (no restrictions)    Frequency of exercise:  6-7 days/week    Duration of exercise:  45-60 minutes    Taking medications regularly:  Yes    Medication side effects:  None    PHQ-2 Total Score: 0    Esophagitis. Resolved with omeprazole.       Today's PHQ-2 Score:   PHQ-2 ( 1999 Pfizer) 11/14/2022   Q1: Little interest or pleasure in doing things 0   Q2: Feeling down, depressed or hopeless 0   PHQ-2 Score 0   PHQ-2 Total Score (12-17 Years)- Positive if 3 or more points; Administer PHQ-A if positive -   Q1: Little interest or pleasure in doing things Not at all   Q2: Feeling down, depressed or hopeless Not at all   PHQ-2 Score 0       Abuse: Current or Past (Physical, Sexual or Emotional) - No  Do you feel safe in your environment? Yes        Social History     Tobacco Use     Smoking status: Never     Smokeless tobacco: Never   Substance Use Topics     Alcohol use: Yes     Comment: 3 drinks per week      If you drink alcohol do you typically have >3 drinks per day or >7 drinks per week? No    Alcohol Use 11/14/2022   Prescreen: >3 drinks/day or >7 drinks/week? No   Prescreen: >3 drinks/day or >7 drinks/week? -       Reviewed orders with patient.  Reviewed health maintenance and updated orders accordingly - YesYes  Lab work is in process    Breast Cancer Screening:    FHS-7:   Breast CA Risk Assessment (FHS-7) 11/9/2021 6/16/2022 11/14/2022   Did any of your first-degree relatives have breast or ovarian cancer? Yes Yes Yes   Did any of your relatives have bilateral breast cancer? Unknown Unknown No   Did any man in your family have breast  cancer? No No No   Did any woman in your family have breast and ovarian cancer? No No Yes   Did any woman in your family have breast cancer before age 50 y? No No No   Do you have 2 or more relatives with breast and/or ovarian cancer? Yes Yes Yes   Do you have 2 or more relatives with breast and/or bowel cancer? Yes No Yes     click delete button to remove this line now  Mammogram Screening: Recommended mammography every 1-2 years with patient discussion and risk factor consideration  Pertinent mammograms are reviewed under the imaging tab.    History of abnormal Pap smear: NO - age 30-65 PAP every 5 years with negative HPV co-testing recommended  PAP / HPV Latest Ref Rng & Units 2/12/2018 10/24/2014 10/21/2011   PAP (Historical) - NIL NIL NIL   HPV16 NEG:Negative Negative - -   HPV18 NEG:Negative Negative - -   HRHPV NEG:Negative Negative - -     Reviewed and updated as needed this visit by clinical staff   Tobacco  Allergies  Meds              Reviewed and updated as needed this visit by Provider                     Review of Systems   Constitutional: Negative for chills and fever.   HENT: Negative for congestion, ear pain, hearing loss and sore throat.    Eyes: Negative for pain and visual disturbance.   Respiratory: Negative for cough and shortness of breath.    Cardiovascular: Negative for chest pain, palpitations and peripheral edema.   Gastrointestinal: Negative for abdominal pain, constipation, diarrhea, heartburn, hematochezia and nausea.   Breasts:  Negative for tenderness, breast mass and discharge.   Genitourinary: Negative for dysuria, frequency, genital sores, hematuria, pelvic pain, urgency, vaginal bleeding and vaginal discharge.   Musculoskeletal: Negative for arthralgias, joint swelling and myalgias.   Skin: Negative for rash.   Neurological: Negative for dizziness, weakness, headaches and paresthesias.   Psychiatric/Behavioral: Negative for mood changes. The patient is not nervous/anxious.   "       OBJECTIVE:   /77 (BP Location: Right arm, Patient Position: Chair, Cuff Size: Adult Regular)   Pulse 87   Temp 98.1  F (36.7  C) (Oral)   Ht 1.626 m (5' 4\")   Wt 63.3 kg (139 lb 9.6 oz)   SpO2 97%   BMI 23.96 kg/m    Physical Exam  GENERAL: healthy, alert and no distress  EYES: Eyes grossly normal to inspection, PERRL and conjunctivae and sclerae normal  HENT: ear canals and TM's normal, nose and mouth without ulcers or lesions  NECK: no adenopathy, no asymmetry, masses, or scars and thyroid normal to palpation  RESP: lungs clear to auscultation - no rales, rhonchi or wheezes  CV: regular rate and rhythm, normal S1 S2, no S3 or S4, no murmur, trace edema  ABDOMEN: soft, nontender, no hepatosplenomegaly, no masses and bowel sounds normal   (female): normal female external genitalia, normal urethral meatus, vaginal mucosa pink, moist, well rugated, and normal cervix/adnexa/uterus without masses or discharge  MS: no gross musculoskeletal defects noted, no edema  SKIN: no suspicious lesions or rashes  NEURO: Normal strength and tone, mentation intact and speech normal  PSYCH: mentation appears normal, affect normal/bright    Diagnostic Test Results:  none     ASSESSMENT/PLAN:       ICD-10-CM    1. Routine general medical examination at a health care facility  Z00.00 Lipid panel reflex to direct LDL Non-fasting      2. Cervical cancer screening  Z12.4 Pap Screen with HPV - recommended age 30 - 65 years      3. Esophageal stricture  K22.2 omeprazole (PRILOSEC) 20 MG DR capsule      4. High priority for 2019-nCoV vaccine  Z23 COVID-19,PF,PFIZER BOOSTER BIVALENT 12+Yrs      refilled omeprazole, stable daily use.           COUNSELING:  Reviewed preventive health counseling, as reflected in patient instructions       Regular exercise       Healthy diet/nutrition    Estimated body mass index is 23.96 kg/m  as calculated from the following:    Height as of this encounter: 1.626 m (5' 4\").    Weight as of " this encounter: 63.3 kg (139 lb 9.6 oz).        She reports that she has never smoked. She has never used smokeless tobacco.      Counseling Resources:  ATP IV Guidelines  Pooled Cohorts Equation Calculator  Breast Cancer Risk Calculator  BRCA-Related Cancer Risk Assessment: FHS-7 Tool  FRAX Risk Assessment  ICSI Preventive Guidelines  Dietary Guidelines for Americans, 2010  USDA's MyPlate  ASA Prophylaxis  Lung CA Screening    EVERTON Kitchen [facility]

## 2022-11-15 LAB
CHOLEST SERPL-MCNC: 274 MG/DL
FASTING STATUS PATIENT QL REPORTED: NO
HDLC SERPL-MCNC: 118 MG/DL
LDLC SERPL CALC-MCNC: 140 MG/DL
NONHDLC SERPL-MCNC: 156 MG/DL
TRIGL SERPL-MCNC: 82 MG/DL

## 2022-11-16 LAB
BKR LAB AP GYN ADEQUACY: NORMAL
BKR LAB AP GYN INTERPRETATION: NORMAL
BKR LAB AP HPV REFLEX: NORMAL
BKR LAB AP PREVIOUS ABNORMAL: NORMAL
PATH REPORT.COMMENTS IMP SPEC: NORMAL
PATH REPORT.COMMENTS IMP SPEC: NORMAL
PATH REPORT.RELEVANT HX SPEC: NORMAL

## 2022-11-17 LAB
HUMAN PAPILLOMA VIRUS 16 DNA: NEGATIVE
HUMAN PAPILLOMA VIRUS 18 DNA: NEGATIVE
HUMAN PAPILLOMA VIRUS FINAL DIAGNOSIS: NORMAL
HUMAN PAPILLOMA VIRUS OTHER HR: NEGATIVE

## 2023-05-23 ENCOUNTER — OFFICE VISIT (OUTPATIENT)
Dept: FAMILY MEDICINE | Facility: CLINIC | Age: 63
End: 2023-05-23
Payer: COMMERCIAL

## 2023-05-23 ENCOUNTER — PATIENT OUTREACH (OUTPATIENT)
Dept: CARE COORDINATION | Facility: CLINIC | Age: 63
End: 2023-05-23

## 2023-05-23 VITALS
RESPIRATION RATE: 16 BRPM | HEIGHT: 64 IN | OXYGEN SATURATION: 98 % | WEIGHT: 140 LBS | DIASTOLIC BLOOD PRESSURE: 74 MMHG | BODY MASS INDEX: 23.9 KG/M2 | SYSTOLIC BLOOD PRESSURE: 135 MMHG | TEMPERATURE: 97.5 F | HEART RATE: 92 BPM

## 2023-05-23 DIAGNOSIS — G25.0 BENIGN FAMILIAL TREMOR: Primary | ICD-10-CM

## 2023-05-23 LAB — TSH SERPL DL<=0.005 MIU/L-ACNC: 2.28 UIU/ML (ref 0.3–4.2)

## 2023-05-23 PROCEDURE — 84443 ASSAY THYROID STIM HORMONE: CPT | Performed by: FAMILY MEDICINE

## 2023-05-23 PROCEDURE — 99213 OFFICE O/P EST LOW 20 MIN: CPT | Performed by: FAMILY MEDICINE

## 2023-05-23 PROCEDURE — 36415 COLL VENOUS BLD VENIPUNCTURE: CPT | Performed by: FAMILY MEDICINE

## 2023-05-23 RX ORDER — PROPRANOLOL HCL 60 MG
60 CAPSULE, EXTENDED RELEASE 24HR ORAL DAILY
Qty: 30 CAPSULE | Refills: 1 | Status: SHIPPED | OUTPATIENT
Start: 2023-05-23 | End: 2023-06-05

## 2023-05-23 NOTE — PROGRESS NOTES
Assessment & Plan   Shavon is a 63 yo F with fhx breast ca, fibrocystic breast dx, libido, shoulder pain here for shakiness of the hands.    Benign familial tremor  Discussed pathophysiology of tremors; this is most likely familial given the family history.  We will check TSH level.  Reviewed treatment options; will start propranolol today and update in 2 weeks.  If symptoms persistent, might consider evaluation by neurology  - propranolol ER (INDERAL LA) 60 MG 24 hr capsule; Take 1 capsule (60 mg) by mouth daily  - TSH with free T4 reflex; Future     See Patient Instructions    Eliot Harry MD  [facility] [place]    Subjective   Shavon is a 62 year old, presenting for the following health issues:  Shaking (On left side )  Shakiness x 1 yr worse in last few months.  Better when doing  Precision is hard; like pouring liquids.  2 brothers and a sister have shakiness.  No palpitations or sweatiness  Had menopausal symptoms but these minimal now.         View : No data to display.              History of Present Illness       Reason for visit:  Shakiness/tremors, ruth left hand (I'm right-handed  Symptom onset:  More than a month  Symptoms include:  Worsening small motor skills like pouring a beverage with two hands, putting on mascara  Symptom intensity:  Moderate  Symptom progression:  Worsening  Had these symptoms before:  Yes  Has tried/received treatment for these symptoms:  No  What makes it worse:  No.    She eats 4 or more servings of fruits and vegetables daily.She consumes 0 sweetened beverage(s) daily.She exercises with enough effort to increase her heart rate 30 to 60 minutes per day.  She exercises with enough effort to increase her heart rate 5 days per week.   She is taking medications regularly.       Concern - Tremors on left side of body   Onset: Always was twitchy but for the last couple of months the shakiness has gotten worse   Description: Shakiness   Intensity:  "moderate  Progression of Symptoms:  worsening  Accompanying Signs & Symptoms:   Previous history of similar problem: family history of being twitchy   Precipitating factors:        Worsened by: None   Alleviating factors:        Improved by: None   Therapies tried and outcome: None      Review of Systems   Constitutional, HEENT, cardiovascular, pulmonary, gi and gu systems are negative, except as otherwise noted.      Objective    /74 (BP Location: Right arm, Patient Position: Sitting, Cuff Size: Adult Regular)   Pulse 92   Temp 97.5  F (36.4  C) (Oral)   Resp 16   Ht 1.626 m (5' 4\")   Wt 63.5 kg (140 lb)   SpO2 98%   BMI 24.03 kg/m    Body mass index is 24.03 kg/m .  Physical Exam   GENERAL: healthy, alert and no distress  RESP: lungs clear to auscultation - no rales, rhonchi or wheezes  CV: regular rate and rhythm, no murmur, click or rub, no peripheral edema   MS: no gross musculoskeletal defects noted, no edema  NEURO: Tremors of both hands, mentation intact and speech normal    "

## 2023-06-04 ENCOUNTER — MYC MEDICAL ADVICE (OUTPATIENT)
Dept: FAMILY MEDICINE | Facility: CLINIC | Age: 63
End: 2023-06-04
Payer: COMMERCIAL

## 2023-06-04 DIAGNOSIS — G25.0 BENIGN FAMILIAL TREMOR: ICD-10-CM

## 2023-06-05 RX ORDER — PROPRANOLOL HCL 60 MG
60 CAPSULE, EXTENDED RELEASE 24HR ORAL DAILY
Qty: 30 CAPSULE | Refills: 2 | Status: SHIPPED | OUTPATIENT
Start: 2023-06-05 | End: 2023-09-14

## 2023-07-21 ENCOUNTER — MYC MEDICAL ADVICE (OUTPATIENT)
Dept: FAMILY MEDICINE | Facility: CLINIC | Age: 63
End: 2023-07-21
Payer: COMMERCIAL

## 2023-07-21 NOTE — TELEPHONE ENCOUNTER
Patient was seen for tremors on 5/23/2023  Per Eliot Viramontes's OV notes-    Benign familial tremor  Discussed pathophysiology of tremors; this is most likely familial given the family history.  We will check TSH level.  Reviewed treatment options; will start propranolol today and update in 2 weeks.  If symptoms persistent, might consider evaluation by neurology    Please see patient's mychart message and advise    Bakari Razo RN  [facility]

## 2023-07-26 ENCOUNTER — ANCILLARY PROCEDURE (OUTPATIENT)
Dept: MAMMOGRAPHY | Facility: CLINIC | Age: 63
End: 2023-07-26
Attending: PHYSICIAN ASSISTANT
Payer: COMMERCIAL

## 2023-07-26 DIAGNOSIS — Z12.31 VISIT FOR SCREENING MAMMOGRAM: ICD-10-CM

## 2023-07-26 PROCEDURE — 77067 SCR MAMMO BI INCL CAD: CPT | Mod: TC | Performed by: RADIOLOGY

## 2023-07-26 RX ORDER — PROPRANOLOL HYDROCHLORIDE 80 MG/1
80 CAPSULE, EXTENDED RELEASE ORAL DAILY
Qty: 30 CAPSULE | Refills: 1 | Status: CANCELLED | OUTPATIENT
Start: 2023-07-26

## 2023-07-26 NOTE — TELEPHONE ENCOUNTER
I would recommend an appointment in person or virtual to discuss the symptoms, medication dose adjustment (taking 2 pills) or alternative medication versus evaluation by neurology.

## 2023-07-26 NOTE — TELEPHONE ENCOUNTER
Dr. Harry,    Patient following up about mychart.     Per pt:    Helizzy Propranolol ER 60 MG was prescribed on May 23  due to a diagnosis of Family Tremors.  Initially all the tremors stopped, but for the past few weeks the tremors on my left side are worsening - even shortly after taking the medication.  [2 hours].  Can you please review and advise whether a medication adjustment might help?  Thank you.  Shavon Portillo          Please advise.     Thanks,  YULIA Tariq  [facility]

## 2023-08-01 ENCOUNTER — VIRTUAL VISIT (OUTPATIENT)
Dept: FAMILY MEDICINE | Facility: CLINIC | Age: 63
End: 2023-08-01
Payer: COMMERCIAL

## 2023-08-01 DIAGNOSIS — G25.0 FAMILIAL TREMOR: Primary | ICD-10-CM

## 2023-08-01 DIAGNOSIS — R25.1 TREMOR OF LEFT HAND: ICD-10-CM

## 2023-08-01 PROCEDURE — 99213 OFFICE O/P EST LOW 20 MIN: CPT | Mod: VID | Performed by: FAMILY MEDICINE

## 2023-08-01 NOTE — PROGRESS NOTES
Shavon is a 62 year old who is being evaluated via a billable video visit.      How would you like to obtain your AVS? MyChart  If the video visit is dropped, the invitation should be resent by: Text to cell phone: [phone]  Will anyone else be joining your video visit? No      4:52 PM  5:05 PM      Assessment & Plan     Familial tremor   Tremors improved for a while on Propranolol; but recurred but only on left side.   - Adult Neurology  Referral; Future    Tremor of left hand   Given is unilateral now discussed evaluation by neurology  - Adult Neurology  Referral; Future     See Patient Instructions    Eliot Harry MD  [facility]    Guille Sands is a 62 year old, presenting for the following health issues:  Recheck Medication    Left side/arm; when thinks about it can stop; happens all the time on and off  Right side not shaking since starting propranolol.    Propranolol worked for 1 month        8/1/2023     4:36 PM   Additional Questions   Roomed by Shelia GILLIAM   Accompanied by self - video visit     HPI   Tremor   Patient has a history of tremor for a while; were affecting upper extremities especially the hands.  Started on propranolol on 5/23/2023; and the tremors stopped for about 1 month.  When recurred have been affecting only the left upper extremity; ruth the hand; she camn consciously stop them. Not worse with use; occurring mostly at res.  She does not experience tremors elsewhere.    Review of Systems         Objective           Vitals:  No vitals were obtained today due to virtual visit.    Physical Exam       Video-Visit Details    Type of service:  Video Visit   Video Start Time:  4.52 PM  Video End Time:5:05 PM    Originating Location (pt. Location): Home  Distant Location (provider location):  On-site  Platform used for Video Visit: [employer]

## 2023-08-03 NOTE — TELEPHONE ENCOUNTER
RECORDS RECEIVED FROM: internal   REASON FOR VISIT: Familial tremor ,Tremor of left hand    Date of Appt: 8/15/23   NOTES (FOR ALL VISITS) STATUS DETAILS   OFFICE NOTE from referring provider Internal Dr Eliot Harry @ [facility] Med:  8/1/23 5/23/23   MEDICATION LIST Internal    IMAGING  (FOR ALL VISITS)     MRI (HEAD, NECK, SPINE) N/A    CT (HEAD, NECK, SPINE) N/A

## 2023-08-14 ASSESSMENT — ACTIVITIES OF DAILY LIVING (ADL)
SPEAKING: (0) NORMAL
USING_KEYS: (0) NORMAL
FEEDING_WITH_A_SPOON: (1) SLIGHTLY ABNORMAL. TREMOR IS PRESENT BUT DOES NOT INTERFERE WITH FEEDING WITH A SPOON.
ADL_TOTAL: 10
POURING: (1) SLIGHTLY ABNORMAL. TREMOR IS PRESENT BUT DOES NOT INTERFERE WITH POURING.
WORKING: (1) SLIGHTLY ABNORMAL. TREMOR IS PRESENT BUT DOES NOT AFFECT PERFORMANCE AT WORK OR AT HOME.
SOCIAL_IMPACT: (2) FEELS EMBARRASSED BY TREMOR IN SOME SOCIAL SITUATIONS OR PROFESSIONAL MEETINGS.
CARRYING_FOOD_TRAYS,_PLATES_OR_SIMILAR_ITEMS: (1) SLIGHTLY ABNORMAL. TREMOR IS PRESENT BUT DOES NOT INTERFERE WITH CARRYING FOOD TRAYS, PLATES OR SIMILAR ITEMS.
DRESS: (0) NORMAL
HYGIENE: (0) NORMAL
OVERALL_DISABILITY_WITH_THE_MOST_AFFECTED_TASK: (1) SLIGHTLY ABNORMAL. TREMOR PRESENT BUT DOES NOT AFFECT TASK.
WRITING: (2) MILDLY ABNORMAL. DIFFICULTY WRITING DUE TO THE TREMOR.
DRINKING_FROM_A_GLASS: (1) SLIGHTLY ABNORMAL. TREMOR IS PRESENT BUT DOES NOT INTERFERE WITH DRINKING FROM A GLASS.

## 2023-08-15 ENCOUNTER — OFFICE VISIT (OUTPATIENT)
Dept: NEUROLOGY | Facility: CLINIC | Age: 63
End: 2023-08-15
Attending: FAMILY MEDICINE
Payer: COMMERCIAL

## 2023-08-15 ENCOUNTER — PRE VISIT (OUTPATIENT)
Dept: NEUROLOGY | Facility: CLINIC | Age: 63
End: 2023-08-15

## 2023-08-15 VITALS
OXYGEN SATURATION: 97 % | BODY MASS INDEX: 24.2 KG/M2 | HEART RATE: 77 BPM | SYSTOLIC BLOOD PRESSURE: 118 MMHG | WEIGHT: 141 LBS | DIASTOLIC BLOOD PRESSURE: 75 MMHG

## 2023-08-15 DIAGNOSIS — G25.0 FAMILIAL TREMOR: ICD-10-CM

## 2023-08-15 DIAGNOSIS — G20.C PARKINSONISM, UNSPECIFIED PARKINSONISM TYPE (H): Primary | ICD-10-CM

## 2023-08-15 DIAGNOSIS — R25.1 TREMOR OF LEFT HAND: ICD-10-CM

## 2023-08-15 PROCEDURE — 99204 OFFICE O/P NEW MOD 45 MIN: CPT | Mod: GC | Performed by: STUDENT IN AN ORGANIZED HEALTH CARE EDUCATION/TRAINING PROGRAM

## 2023-08-15 RX ORDER — CARBIDOPA AND LEVODOPA 25; 100 MG/1; MG/1
TABLET ORAL
Qty: 180 TABLET | Refills: 11 | Status: SHIPPED | OUTPATIENT
Start: 2023-08-15 | End: 2023-11-15

## 2023-08-15 RX ORDER — MULTIVITAMIN,THERAPEUTIC
1 TABLET ORAL DAILY
COMMUNITY
End: 2023-11-14

## 2023-08-15 ASSESSMENT — UNIFIED PARKINSONS DISEASE RATING SCALE (UPDRS)
PARKINSONS_MEDS: OFF
ARISING_CHAIR: (0) NORMAL: NO PROBLEMS. ABLE TO ARISE QUICKLY WITHOUT HESITATION.
RIGIDITY_RLE: (0) NORMAL: NO RIGIDITY.
AMPLITUDE_LLE: (0) NORMAL: NO TREMOR.
TOTAL_SCORE_LEFT: 10
AMPLITUDE_RLE: (0) NORMAL: NO TREMOR.
MOVEMENTS_INTERFERE_WITH_RATINGS: NO
TOTAL_SCORE: 0
SPONTANEITY_OF_MOVEMENT: (0) NORMAL: NO PROBLEMS.
RIGIDITY_NECK: (0) NORMAL: NO RIGIDITY.
FACIAL_EXPRESSION: (1) SLIGHT: MINIMAL MASKED FACIES MANIFESTED ONLY BY DECREASED FREQUENCY OF BLINKING.
FINGER_TAPPING_RIGHT: (0) NORMAL: NO PROBLEMS.
POSTURE: (1) SLIGHT: NOT QUITE ERECT, BUT COULD BE NORMAL FOR OLDER PERSONS.
RIGIDITY_LLE: (0) NORMAL: NO RIGIDITY.
AMPLITUDE_LIP_JAW: (0) NORMAL: NO TREMOR.
TOETAPPING_LEFT: (1) SLIGHT: ANY OF THE FOLLOWING: A) THE REGULAR RHYTHM IS BROKEN WITH ONE WITH ONE OR TWO INTERRUPTIONS OR HESITATIONS OF THE MOVEMENT  B) SLIGHT SLOWING  C) THE AMPLITUDE DECREMENTS NEAR THE END OF THE 10 MOVEMENTS.
HANDMOVEMENTS_RIGHT: (0) NORMAL: NO PROBLEMS.
RIGIDITY_RUE: (0) NORMAL: NO RIGIDITY.
DYSKINESIAS_PRESENT: NO
FINGER_TAPPING_LEFT: (1) SLIGHT: ANY OF THE FOLLOWING: A) THE REGULAR RHYTHM IS BROKEN WITH ONE WITH ONE OR TWO INTERRUPTIONS OR HESITATIONS OF THE MOVEMENT  B) SLIGHT SLOWING  C) THE AMPLITUDE DECREMENTS NEAR THE END OF THE 10 MOVEMENTS.
PRONATION_SUPINATION_RIGHT: (0) NORMAL: NO PROBLEMS.
AXIAL_SCORE: 3
CONSTANCY_TREMOR_ATREST: (4) SEVERE: TREMOR AT REST IS PRESENT > 75% OF THE ENTIRE EXAMINATION PERIOD.
PRONATION_SUPINATION_LEFT: (1) SLIGHT: ANY OF THE FOLLOWING: A) THE REGULAR RHYTHM IS BROKEN WITH ONE WITH ONE OR TWO INTERRUPTIONS OR HESITATIONS OF THE MOVEMENT  B) SLIGHT SLOWING  C) THE AMPLITUDE DECREMENTS NEAR THE END OF THE 10 MOVEMENTS.
GAIT: (1) SLIGHT: INDEPENDENT WALKING WITH MINOR GAIT IMPAIRMENT.
RIGIDITY_LUE: (2) MILD: RIGIDITY DETECTED WITHOUT THE ACTIVATION MANEUVER. FULL RANGE OF MOTION IS EASILY ACHIEVED.
HANDMOVEMENTS_LEFT: (1) SLIGHT: ANY OF THE FOLLOWING: A) THE REGULAR RHYTHM IS BROKEN WITH ONE WITH ONE OR TWO INTERRUPTIONS OR HESITATIONS OF THE MOVEMENT  B) SLIGHT SLOWING  C) THE AMPLITUDE DECREMENTS NEAR THE END OF THE 10 MOVEMENTS.
POSTURAL_STABILITY: (0) NORMAL: NO PROBLEMS. RECOVERS WITH ONE OR TWO STEPS.
AMPLITUDE_RUE: (0) NORMAL: NO TREMOR.
SPEECH: (0) NORMAL: NO SPEECH PROBLEMS.
AMPLITUDE_LUE: (2) MILD: > 1 CM BUT < 3 CM IN MAXIMAL AMPLITUDE.
LEG_AGILITY_LEFT: (0) NORMAL: NO PROBLEMS.
LEG_AGILITY_RIGHT: (0) NORMAL: NO PROBLEMS.
TOTAL_SCORE: 17
TOETAPPING_RIGHT: (0) NORMAL: NO PROBLEMS.
FREEZING_GAIT: (0) NORMAL: NO FREEZING.

## 2023-08-15 NOTE — PROGRESS NOTES
"Department of Neurology  Movement Disorders Division   New Patient Visit    Patient: Jeni Portillo   MRN: [id number]   : 1960   Date of Visit: 8/15/2023    CC: tremor    HPI:  Shavon is a 63yo R-handed woman w/ a hx of collagenous colitis who presents to movement disorders clinic as a new consult for tremor.     Shavon notes that she first noticed a tremor on her L arm ~3y ago. She then developed a tremor in the R hand that was more bothersome for fine motor tasks such as handwriting and makeup application. The LUE tremor was present both at rest and w/ actions, RUE w/ tasks/action only. The tremors have gotten worse over the years and she decided to seek treatment after she started to develop more dramatic violent movements of the LUE during times of increased stress. She started propranolol per her PCP ~ end of May 2023 and noticed improvement in both sides; ~mid-July she noticed the tremor returned L>>R and at that time was referred to neurology.     She tried to cut caffeine w/ minimal impact; noticed alcohol improved tremor b/l. Has not done OT. Sleeps well.     She has two brothers w/ a similar b/l UE tremor; one for \"as long as she can remember\" the other developed it more recently. She has one sister w/ a tremor thought secondary to thyroid disease now improved on medication. No other family members w/ tremor that she knows of.    No known heavy metal or pesticide exposures. No  exposure.    She is a retired telephone directory saleswoman.     The tremor interferes w/ yoga; finds her L side to be a bit slower when she increases jessica on exercise bike. She favors her R side, which is less impacted by tremor so eating, hygiene, etc are less impacted.    Parkinson Disease Motor Symptom Review:  Motor fluctuations: N/A  Dyskinesia: N/A  Wearing off:  N/A  Freezing of gait: denies  Dystonia: denies  Tremor: as above  Rigidity: denies  Bradykinesia: denies     Parkinson's Disease Non-motor " "Symptom Review:  Depression - denies  Anxiety - denies  Cognitive impairment -  denies  Sleep disturbances - denies   GI symptoms - denies  Urinary symptoms - denies   Balance - denies  Pain - denies  Autonomic dysfunction - denies  Hallucinations - denies  Speech - denies  Swallowing - denies (had esophageal procedure years ago, \"stretching\")  Salivation - denies  Anosmia - denies  Dopamine agonist side effects - N/A  Dopamine dysregulation  - N/A     Driving: yes, no issues  Living situation: She lives at home w/ her . She is   Medication compliance good  ADL's: the patient is independent w/ all ADLs.      Related Medications    Propranolol ER 60mg 1       ROS:  All others negative except as listed above.    Past Medical History:   Diagnosis Date    Adenomatous polyp of colon, unspecified part of colon 04/2018    need colonoscopy Q 3 years     Chronic diarrhea 04/21/2009    Collagenous colitis, diagnosed after episode of diarrhea with ongoing symptoms. Saw GI-- had colonoscopy. Treated with2 months of rectal steroid foam-- now no symptoms        Past Surgical History:   Procedure Laterality Date    BUNIONECTOMY  6-2011    left    COLONOSCOPY  04/12/2018    every 3 years for adenomatous polyps     ORTHOPEDIC SURGERY      Bunionectomy on both feet    TONSILLECTOMY          Current Outpatient Medications   Medication Sig Dispense Refill    docosanol (ABREVA) 10 % CREA Apply  topically 5 times daily. (Patient taking differently: Apply topically as needed) 1 Tube 5    fexofenadine (ALLEGRA) 60 MG tablet Take 1 tablet by mouth 2 times daily. (Patient taking differently: Take 60 mg by mouth as needed) 180 tablet 3    ibuprofen (ADVIL,MOTRIN) 200 MG tablet Take 1 tablet by mouth every 4 hours as needed for pain. 100 tablet 3    multivitamin, therapeutic (THERA-VIT) TABS tablet Take 1 tablet by mouth daily      omeprazole (PRILOSEC) 20 MG DR capsule TAKE 1 CAPSULE BY MOUTH EVERY DAY 90 capsule 3    propranolol ER " (INDERAL LA) 60 MG 24 hr capsule Take 1 capsule (60 mg) by mouth daily 30 capsule 2       Allergies   Allergen Reactions    Nkda [No Known Drug Allergy]         Family History   Problem Relation Age of Onset    Breast Cancer Sister         Diagnosed in 2021.  Age 59    Obesity Sister     Obesity Sister     Obesity Brother     Cancer Maternal Grandfather         Lung cancer    Cancer Paternal Grandfather         Lung and brain cancer    Diabetes Type 1 Son     Diabetes Son         Type 1.5, age 30    Breast Cancer Other         Breast Cancer    Hypertension No family hx of         Social History     Socioeconomic History    Marital status:      Spouse name: Not on file    Number of children: Not on file    Years of education: Not on file    Highest education level: Not on file   Occupational History    Not on file   Tobacco Use    Smoking status: Never    Smokeless tobacco: Never   Substance and Sexual Activity    Alcohol use: Yes     Comment: 3 drinks per week     Drug use: No    Sexual activity: Yes     Partners: Male   Other Topics Concern    Parent/sibling w/ CABG, MI or angioplasty before 65F 55M? No   Social History Narrative    Not on file     Social Determinants of Health     Financial Resource Strain: Low Risk  (11/9/2021)    Overall Financial Resource Strain (CARDIA)     Difficulty of Paying Living Expenses: Not hard at all   Food Insecurity: No Food Insecurity (11/9/2021)    Hunger Vital Sign     Worried About Running Out of Food in the Last Year: Never true     Ran Out of Food in the Last Year: Never true   Transportation Needs: Unmet Transportation Needs (11/9/2021)    PRAPARE - Transportation     Lack of Transportation (Medical): Yes     Lack of Transportation (Non-Medical): No   Physical Activity: Sufficiently Active (11/9/2021)    Exercise Vital Sign     Days of Exercise per Week: 5 days     Minutes of Exercise per Session: 30 min   Stress: No Stress Concern Present (11/9/2021)    [employer] - Occupational Stress Questionnaire     Feeling of Stress : Not at all   Social Connections: Moderately Integrated (11/9/2021)    Social Connection and Isolation Panel [NHANES]     Frequency of Communication with Friends and Family: More than three times a week     Frequency of Social Gatherings with Friends and Family: Once a week     Attends Confucianist Services: Never     Active Member of Clubs or Organizations: Yes     Attends Club or Organization Meetings: Not on file     Marital Status:    Intimate Partner Violence: Not on file   Housing Stability: Unknown (11/9/2021)    Housing Stability Vital Sign     Unable to Pay for Housing in the Last Year: No     Number of Places Lived in the Last Year: Not on file     Unstable Housing in the Last Year: No        PHYSICAL EXAM:  /75   Pulse 77   Wt 64 kg (141 lb)   SpO2 97%   BMI 24.20 kg/m      Gen: alert, active, attentive, appropriately groomed   HEENT: normocephalic, eyes open with no discharge, nares patent, oropharynx clear-no lesions, no bruits  Chest: normal configuration, chest rise equal b/l, non labored breathing   CV: regular rate and rhythm, no murmur, distal pulses palpable  Pulmonary: lungs are clear bilaterally, no wheezes/crackles/rales  Extremities: no clubbing/edema/cyanosis in BUE/BLE  Psych: mood stable     NEURO:  MS: Alert and oriented to person, place, time, and situation.  Speech normal to comprehension and naming.  Recent and remote memory intact.  Attention and concentration normal.  Fund of knowledge normal.    CN:  II: Pupils equal, round, and reactive to light. VFF.  III, IV, VI: EOM normal range, no nystagmus.  Normal saccades.  V:  Facial sensation intact.  VII: Face symmetric at rest and with activation  VIII: Intact to finger rub bilaterally.  IX, X: Palate rise b/l, uvula midline.  No dysarthria.  XI: Trapezius and SCM 5/5 bilaterally.  XII: Tongue protrudes midline. No fasciculation or  atrophy noted.    Motor:    See motor scales below    Sensation:    Intact to LT, vibration, temperature, and proprioception in all extremities.    Coordination:    FTN and HTN w/o dysmetria bilaterally.    Gait:    Tandem gait intact.  Able to walk on toes and heels.  Romberg negative.        8/15/2023     2:00 PM   Tremor Motor Scale   Assessment Time 14:30   Medication On   DBS - Right Brain None   DBS - Left Brain None   Head 0   Face & Jaw 0   Voice 0   Outstretched - RIGHT 0   Outstretched - LEFT 2   Wingbeating - RIGHT 0.5   Wingbeating - LEFT 1.5   Kinetic - RIGHT 0   Kinetic - LEFT 1.5   Lower Limb - RIGHT 0   Lower Limb - LEFT 0   Lower Limb (Max) 0   Spiral - RIGHT 1.5   Spiral - LEFT 2   Handwriting 0   Dot approx - RIGHT 1.5   Dot approx - LEFT 2   Trunk (Standing) 0   Total Right 3.5   Total Left 9   Axial 0   TOTAL 12.5           8/15/2023     2:00 PM   UPDRS Motor Scale   Time: 14:30   Medication Off   R Brain DBS: None   L Brain DBS: None   Dyskinesia (LID) No   Did LID interfere No   Speech 0   Facial Expression 1   Rigidity Neck 0   Rigidity RUE 0   Rigidity LUE 2   Rigidity RLE 0   Rigidity LLE 0   Finger Taps R 0   Finger Taps L 1   Hand Mvt R 0   Hand Mvt L 1   Pron-/Supinate R 0   Pron-/Supinate L 1   Toe Tap R 0   Toe Tap L 1   Leg Agility R 0   Leg Agility L 0   Arise From Chair 0   Gait 1   Gait Freezing 0   Postural Stability 0   Posture 1   Global Spont Mvt 0   Postural Tremor RUE 0   Postural Tremor LUE 1   Kinetic Tremor RUE 0   Kinetic Tremor LUE 1   Rest Tremor RUE 0   Rest Tremor LUE 2   Rest Tremor RLE 0   Rest Tremor LLE 0   Rest Tremor Lip/Jaw 0   Rest Tremor Constancy 4   Total Right 0   Total Left 10   Axial Total 3   Total 17         LABS:   Latest Reference Range & Units 05/23/23 09:53   TSH 0.30 - 4.20 uIU/mL 2.28         IMAGING:  N/A      ASSESSMENT/PLAN:  Shavon is a 63yo woman w/ a hx of collagenous colitis who presents to movement disorders clinic as a new consult for  tremor.     Pt presents w/ an asymmetric tremor; LUE resting and action w/ limited response to propranolol, RUE action only w/ excellent response to propranolol. She has mild asymmetric parkinsonism L>R on exam.     Her presentation is consistent w/ essential tremor w/ superimposed Parkinson's disease. She is currently minimally impaired from a functional standpoint as her tremor is well-treated on her dominant side.      She was counseled on the option of pursuing Shanika scan now vs waiting for additional information gathered from observation over time. We also discussed starting levodopa vs increasing propranolol as a trial for the L-sided tremor with the caveat that the tremor may not respond.  She is having some minor exercise intolerance so will not increase propanolol.  We will opt to do a trial of levodopa with a Shanika scan at this time.    - start carbidopa/levodopa 25/100 1tab TID  - obtain Shanika scan    Return to clinic 3mo    Jose Aguilar MD  Fellow  Movement Disorders Division  [facility] Neurology      Neurology Attending Attestation:   I personally saw this patient with our fellow and agree with the their findings and plan of care as documented in the note. I personally performed salient aspects of the history and neurological examination.  I personally reviewed the vital signs, medications, and labs.    Kaley Wright MD   of Neurology  Movement Disorders Division

## 2023-08-15 NOTE — LETTER
"8/15/2023       RE: Jeni Portillo  [address]  [facility] [address]       Dear Colleague,    Thank you for referring your patient, Jeni Portillo, to the [facility] [place] [facility]. Please see a copy of my visit note below.    Department of Neurology  Movement Disorders Division   New Patient Visit    Patient: Jeni Portillo   MRN: [id number]   : 1960   Date of Visit: 8/15/2023    CC: tremor    HPI:  Shavon is a 61yo R-handed woman w/ a hx of collagenous colitis who presents to movement disorders clinic as a new consult for tremor.     Shavon notes that she first noticed a tremor on her L arm ~3y ago. She then developed a tremor in the R hand that was more bothersome for fine motor tasks such as handwriting and makeup application. The LUE tremor was present both at rest and w/ actions, RUE w/ tasks/action only. The tremors have gotten worse over the years and she decided to seek treatment after she started to develop more dramatic violent movements of the LUE during times of increased stress. She started propranolol per her PCP ~ end of May 2023 and noticed improvement in both sides; ~mid-July she noticed the tremor returned L>>R and at that time was referred to neurology.     She tried to cut caffeine w/ minimal impact; noticed alcohol improved tremor b/l. Has not done OT. Sleeps well.     She has two brothers w/ a similar b/l UE tremor; one for \"as long as she can remember\" the other developed it more recently. She has one sister w/ a tremor thought secondary to thyroid disease now improved on medication. No other family members w/ tremor that she knows of.    No known heavy metal or pesticide exposures. No  exposure.    She is a retired telephone directory saleswoman.     The tremor interferes w/ yoga; finds her L side to be a bit slower when she increases jessica on exercise bike. She favors her R side, " "which is less impacted by tremor so eating, hygiene, etc are less impacted.    Parkinson Disease Motor Symptom Review:  Motor fluctuations: N/A  Dyskinesia: N/A  Wearing off:  N/A  Freezing of gait: denies  Dystonia: denies  Tremor: as above  Rigidity: denies  Bradykinesia: denies     Parkinson's Disease Non-motor Symptom Review:  Depression - denies  Anxiety - denies  Cognitive impairment -  denies  Sleep disturbances - denies   GI symptoms - denies  Urinary symptoms - denies   Balance - denies  Pain - denies  Autonomic dysfunction - denies  Hallucinations - denies  Speech - denies  Swallowing - denies (had esophageal procedure years ago, \"stretching\")  Salivation - denies  Anosmia - denies  Dopamine agonist side effects - N/A  Dopamine dysregulation  - N/A     Driving: yes, no issues  Living situation: She lives at home w/ her . She is   Medication compliance good  ADL's: the patient is independent w/ all ADLs.      Related Medications    Propranolol ER 60mg 1       ROS:  All others negative except as listed above.    Past Medical History:   Diagnosis Date    Adenomatous polyp of colon, unspecified part of colon 04/2018    need colonoscopy Q 3 years     Chronic diarrhea 04/21/2009    Collagenous colitis, diagnosed after episode of diarrhea with ongoing symptoms. Saw GI-- had colonoscopy. Treated with2 months of rectal steroid foam-- now no symptoms        Past Surgical History:   Procedure Laterality Date    BUNIONECTOMY  6-2011    left    COLONOSCOPY  04/12/2018    every 3 years for adenomatous polyps     ORTHOPEDIC SURGERY      Bunionectomy on both feet    TONSILLECTOMY          Current Outpatient Medications   Medication Sig Dispense Refill    docosanol (ABREVA) 10 % CREA Apply  topically 5 times daily. (Patient taking differently: Apply topically as needed) 1 Tube 5    fexofenadine (ALLEGRA) 60 MG tablet Take 1 tablet by mouth 2 times daily. (Patient taking differently: Take 60 mg by mouth as needed) " 180 tablet 3    ibuprofen (ADVIL,MOTRIN) 200 MG tablet Take 1 tablet by mouth every 4 hours as needed for pain. 100 tablet 3    multivitamin, therapeutic (THERA-VIT) TABS tablet Take 1 tablet by mouth daily      omeprazole (PRILOSEC) 20 MG DR capsule TAKE 1 CAPSULE BY MOUTH EVERY DAY 90 capsule 3    propranolol ER (INDERAL LA) 60 MG 24 hr capsule Take 1 capsule (60 mg) by mouth daily 30 capsule 2       Allergies   Allergen Reactions    Nkda [No Known Drug Allergy]         Family History   Problem Relation Age of Onset    Breast Cancer Sister         Diagnosed in 2021.  Age 59    Obesity Sister     Obesity Sister     Obesity Brother     Cancer Maternal Grandfather         Lung cancer    Cancer Paternal Grandfather         Lung and brain cancer    Diabetes Type 1 Son     Diabetes Son         Type 1.5, age 30    Breast Cancer Other         Breast Cancer    Hypertension No family hx of         Social History     Socioeconomic History    Marital status:      Spouse name: Not on file    Number of children: Not on file    Years of education: Not on file    Highest education level: Not on file   Occupational History    Not on file   Tobacco Use    Smoking status: Never    Smokeless tobacco: Never   Substance and Sexual Activity    Alcohol use: Yes     Comment: 3 drinks per week     Drug use: No    Sexual activity: Yes     Partners: Male   Other Topics Concern    Parent/sibling w/ CABG, MI or angioplasty before 65F 55M? No   Social History Narrative    Not on file     Social Determinants of Health     Financial Resource Strain: Low Risk  (11/9/2021)    Overall Financial Resource Strain (CARDIA)     Difficulty of Paying Living Expenses: Not hard at all   Food Insecurity: No Food Insecurity (11/9/2021)    Hunger Vital Sign     Worried About Running Out of Food in the Last Year: Never true     Ran Out of Food in the Last Year: Never true   Transportation Needs: Unmet Transportation Needs (11/9/2021)    PRAPARE -  Transportation     Lack of Transportation (Medical): Yes     Lack of Transportation (Non-Medical): No   Physical Activity: Sufficiently Active (11/9/2021)    Exercise Vital Sign     Days of Exercise per Week: 5 days     Minutes of Exercise per Session: 30 min   Stress: No Stress Concern Present (11/9/2021)    [employer] - Occupational Stress Questionnaire     Feeling of Stress : Not at all   Social Connections: Moderately Integrated (11/9/2021)    Social Connection and Isolation Panel [NHANES]     Frequency of Communication with Friends and Family: More than three times a week     Frequency of Social Gatherings with Friends and Family: Once a week     Attends Mormonism Services: Never     Active Member of Clubs or Organizations: Yes     Attends Club or Organization Meetings: Not on file     Marital Status:    Intimate Partner Violence: Not on file   Housing Stability: Unknown (11/9/2021)    Housing Stability Vital Sign     Unable to Pay for Housing in the Last Year: No     Number of Places Lived in the Last Year: Not on file     Unstable Housing in the Last Year: No        PHYSICAL EXAM:  /75   Pulse 77   Wt 64 kg (141 lb)   SpO2 97%   BMI 24.20 kg/m      Gen: alert, active, attentive, appropriately groomed   HEENT: normocephalic, eyes open with no discharge, nares patent, oropharynx clear-no lesions, no bruits  Chest: normal configuration, chest rise equal b/l, non labored breathing   CV: regular rate and rhythm, no murmur, distal pulses palpable  Pulmonary: lungs are clear bilaterally, no wheezes/crackles/rales  Extremities: no clubbing/edema/cyanosis in BUE/BLE  Psych: mood stable     NEURO:  MS: Alert and oriented to person, place, time, and situation.  Speech normal to comprehension and naming.  Recent and remote memory intact.  Attention and concentration normal.  Fund of knowledge normal.    CN:  II: Pupils equal, round, and reactive to light. VFF.  III, IV, VI:  EOM normal range, no nystagmus.  Normal saccades.  V:  Facial sensation intact.  VII: Face symmetric at rest and with activation  VIII: Intact to finger rub bilaterally.  IX, X: Palate rise b/l, uvula midline.  No dysarthria.  XI: Trapezius and SCM 5/5 bilaterally.  XII: Tongue protrudes midline. No fasciculation or atrophy noted.    Motor:    See motor scales below    Sensation:    Intact to LT, vibration, temperature, and proprioception in all extremities.    Coordination:    FTN and HTN w/o dysmetria bilaterally.    Gait:    Tandem gait intact.  Able to walk on toes and heels.  Romberg negative.        8/15/2023     2:00 PM   Tremor Motor Scale   Assessment Time 14:30   Medication On   DBS - Right Brain None   DBS - Left Brain None   Head 0   Face & Jaw 0   Voice 0   Outstretched - RIGHT 0   Outstretched - LEFT 2   Wingbeating - RIGHT 0.5   Wingbeating - LEFT 1.5   Kinetic - RIGHT 0   Kinetic - LEFT 1.5   Lower Limb - RIGHT 0   Lower Limb - LEFT 0   Lower Limb (Max) 0   Spiral - RIGHT 1.5   Spiral - LEFT 2   Handwriting 0   Dot approx - RIGHT 1.5   Dot approx - LEFT 2   Trunk (Standing) 0   Total Right 3.5   Total Left 9   Axial 0   TOTAL 12.5           8/15/2023     2:00 PM   UPDRS Motor Scale   Time: 14:30   Medication Off   R Brain DBS: None   L Brain DBS: None   Dyskinesia (LID) No   Did LID interfere No   Speech 0   Facial Expression 1   Rigidity Neck 0   Rigidity RUE 0   Rigidity LUE 2   Rigidity RLE 0   Rigidity LLE 0   Finger Taps R 0   Finger Taps L 1   Hand Mvt R 0   Hand Mvt L 1   Pron-/Supinate R 0   Pron-/Supinate L 1   Toe Tap R 0   Toe Tap L 1   Leg Agility R 0   Leg Agility L 0   Arise From Chair 0   Gait 1   Gait Freezing 0   Postural Stability 0   Posture 1   Global Spont Mvt 0   Postural Tremor RUE 0   Postural Tremor LUE 1   Kinetic Tremor RUE 0   Kinetic Tremor LUE 1   Rest Tremor RUE 0   Rest Tremor LUE 2   Rest Tremor RLE 0   Rest Tremor LLE 0   Rest Tremor Lip/Jaw 0   Rest Tremor Constancy  4   Total Right 0   Total Left 10   Axial Total 3   Total 17         LABS:   Latest Reference Range & Units 05/23/23 09:53   TSH 0.30 - 4.20 uIU/mL 2.28     IMAGING:  N/A    ASSESSMENT/PLAN:  Shavon is a 61yo woman w/ a hx of collagenous colitis who presents to movement disorders clinic as a new consult for tremor.     Pt presents w/ an asymmetric tremor; LUE resting and action w/ limited response to propranolol, RUE action only w/ excellent response to propranolol. She has mild asymmetric parkinsonism L>R on exam.     Her presentation is consistent w/ essential tremor w/ superimposed Parkinson's disease. She is currently minimally impaired from a functional standpoint as her tremor is well-treated on her dominant side.      She was counseled on the option of pursuing Shanika scan now vs waiting for additional information gathered from observation over time. We also discussed starting levodopa vs increasing propranolol as a trial for the L-sided tremor with the caveat that the tremor may not respond.  She is having some minor exercise intolerance so will not increase propanolol.  We will opt to do a trial of levodopa with a Shanika scan at this time.    - start carbidopa/levodopa 25/100 1tab TID  - obtain Shanika scan    Return to clinic 3mo    Jose Aguilar MD  Fellow  Movement Disorders Division  [facility]    Neurology Attending Attestation:   I personally saw this patient with our fellow and agree with the their findings and plan of care as documented in the note. I personally performed salient aspects of the history and neurological examination.  I personally reviewed the vital signs, medications, and labs.      Again, thank you for allowing me to participate in the care of your patient.      Sincerely,    Kaley Wright MD

## 2023-08-15 NOTE — PATIENT INSTRUCTIONS
You likely have two different types of tremor. One is called essential tremor and tends to run in families; this is the tremor most affecting your right hand.     The one most likely affecting your left hand is more consistent with parkinson's. Parkinson's is treated symptomatically, meaning we use medicines to help you feel and function at your best.     We will start a medication to target the left hand tremor and ordered a scan that will help us determine with more certainty if you have Parkinson's disease or not at this time.

## 2023-09-12 ENCOUNTER — TELEPHONE (OUTPATIENT)
Dept: NUCLEAR MEDICINE | Facility: CLINIC | Age: 63
End: 2023-09-12
Payer: COMMERCIAL

## 2023-09-12 NOTE — TELEPHONE ENCOUNTER
No prior authorization required with this patient's [facility] insurance. Okay to proceed.    NOTE: This patient insurance requires first of the month insurance verification. Eligibility will need to be verified on 10/1/2023. FCs will reach out to the [employer] team if needed.

## 2023-09-14 DIAGNOSIS — G25.0 BENIGN FAMILIAL TREMOR: ICD-10-CM

## 2023-09-14 RX ORDER — PROPRANOLOL HCL 60 MG
60 CAPSULE, EXTENDED RELEASE 24HR ORAL DAILY
Qty: 90 CAPSULE | Refills: 0 | Status: SHIPPED | OUTPATIENT
Start: 2023-09-14 | End: 2023-11-14

## 2023-10-11 ENCOUNTER — PATIENT OUTREACH (OUTPATIENT)
Dept: GASTROENTEROLOGY | Facility: CLINIC | Age: 63
End: 2023-10-11
Payer: COMMERCIAL

## 2023-10-12 ENCOUNTER — ANCILLARY PROCEDURE (OUTPATIENT)
Dept: NUCLEAR MEDICINE | Facility: CLINIC | Age: 63
End: 2023-10-12
Attending: INTERNAL MEDICINE
Payer: COMMERCIAL

## 2023-10-12 DIAGNOSIS — G20.C PARKINSONISM, UNSPECIFIED PARKINSONISM TYPE (H): ICD-10-CM

## 2023-10-12 PROCEDURE — 78803 RP LOCLZJ TUM SPECT 1 AREA: CPT | Mod: GC | Performed by: RADIOLOGY

## 2023-10-12 PROCEDURE — A9584 IODINE I-123 IOFLUPANE: HCPCS | Performed by: RADIOLOGY

## 2023-10-12 RX ORDER — IOFLUPANE I-123 2 MCI/ML
4-6 INJECTION, SOLUTION INTRAVENOUS ONCE
Status: COMPLETED | OUTPATIENT
Start: 2023-10-12 | End: 2023-10-12

## 2023-10-12 RX ADMIN — IOFLUPANE I-123 5.13 MILLICURIE: 2 INJECTION, SOLUTION INTRAVENOUS at 11:20

## 2023-10-16 ENCOUNTER — PATIENT OUTREACH (OUTPATIENT)
Dept: CARE COORDINATION | Facility: CLINIC | Age: 63
End: 2023-10-16
Payer: COMMERCIAL

## 2023-11-09 ASSESSMENT — ENCOUNTER SYMPTOMS
NAUSEA: 1
EYE PAIN: 0
HEMATURIA: 0
SHORTNESS OF BREATH: 0
FREQUENCY: 0
FEVER: 0
DIZZINESS: 0
NERVOUS/ANXIOUS: 0
ABDOMINAL PAIN: 0
ARTHRALGIAS: 0
SORE THROAT: 0
JOINT SWELLING: 0
DYSURIA: 0
HEADACHES: 0
BREAST MASS: 0
HEMATOCHEZIA: 0
WEAKNESS: 0
MYALGIAS: 0
DIARRHEA: 0
COUGH: 0
PARESTHESIAS: 0
HEARTBURN: 0
CONSTIPATION: 0
PALPITATIONS: 0
CHILLS: 0

## 2023-11-14 ENCOUNTER — OFFICE VISIT (OUTPATIENT)
Dept: INTERNAL MEDICINE | Facility: CLINIC | Age: 63
End: 2023-11-14
Payer: COMMERCIAL

## 2023-11-14 VITALS
DIASTOLIC BLOOD PRESSURE: 83 MMHG | SYSTOLIC BLOOD PRESSURE: 125 MMHG | TEMPERATURE: 99.1 F | RESPIRATION RATE: 16 BRPM | BODY MASS INDEX: 23.87 KG/M2 | OXYGEN SATURATION: 99 % | HEIGHT: 64 IN | WEIGHT: 139.8 LBS | HEART RATE: 86 BPM

## 2023-11-14 DIAGNOSIS — Z00.00 ROUTINE GENERAL MEDICAL EXAMINATION AT A HEALTH CARE FACILITY: Primary | ICD-10-CM

## 2023-11-14 DIAGNOSIS — Z13.220 SCREENING FOR CHOLESTEROL LEVEL: ICD-10-CM

## 2023-11-14 DIAGNOSIS — E55.9 VITAMIN D DEFICIENCY: ICD-10-CM

## 2023-11-14 DIAGNOSIS — Z13.1 SCREENING FOR DIABETES MELLITUS: ICD-10-CM

## 2023-11-14 DIAGNOSIS — K22.2 ESOPHAGEAL STRICTURE: ICD-10-CM

## 2023-11-14 LAB
ALBUMIN SERPL BCG-MCNC: 4.8 G/DL (ref 3.5–5.2)
ALP SERPL-CCNC: 47 U/L (ref 40–150)
ALT SERPL W P-5'-P-CCNC: 12 U/L (ref 0–50)
ANION GAP SERPL CALCULATED.3IONS-SCNC: 15 MMOL/L (ref 7–15)
AST SERPL W P-5'-P-CCNC: 47 U/L (ref 0–45)
BASOPHILS # BLD AUTO: 0 10E3/UL (ref 0–0.2)
BASOPHILS NFR BLD AUTO: 1 %
BILIRUB SERPL-MCNC: 0.8 MG/DL
BUN SERPL-MCNC: 14.2 MG/DL (ref 8–23)
CALCIUM SERPL-MCNC: 9.9 MG/DL (ref 8.8–10.2)
CHLORIDE SERPL-SCNC: 99 MMOL/L (ref 98–107)
CHOLEST SERPL-MCNC: 270 MG/DL
CREAT SERPL-MCNC: 0.78 MG/DL (ref 0.51–0.95)
DEPRECATED HCO3 PLAS-SCNC: 23 MMOL/L (ref 22–29)
EGFRCR SERPLBLD CKD-EPI 2021: 84 ML/MIN/1.73M2
EOSINOPHIL # BLD AUTO: 0.1 10E3/UL (ref 0–0.7)
EOSINOPHIL NFR BLD AUTO: 1 %
ERYTHROCYTE [DISTWIDTH] IN BLOOD BY AUTOMATED COUNT: 12.5 % (ref 10–15)
GLUCOSE SERPL-MCNC: 86 MG/DL (ref 70–99)
HBA1C MFR BLD: 5 % (ref 0–5.6)
HCT VFR BLD AUTO: 40.6 % (ref 35–47)
HDLC SERPL-MCNC: 110 MG/DL
HGB BLD-MCNC: 13.6 G/DL (ref 11.7–15.7)
IMM GRANULOCYTES # BLD: 0 10E3/UL
IMM GRANULOCYTES NFR BLD: 0 %
LDLC SERPL CALC-MCNC: 139 MG/DL
LYMPHOCYTES # BLD AUTO: 1.2 10E3/UL (ref 0.8–5.3)
LYMPHOCYTES NFR BLD AUTO: 21 %
MCH RBC QN AUTO: 33 PG (ref 26.5–33)
MCHC RBC AUTO-ENTMCNC: 33.5 G/DL (ref 31.5–36.5)
MCV RBC AUTO: 99 FL (ref 78–100)
MONOCYTES # BLD AUTO: 0.3 10E3/UL (ref 0–1.3)
MONOCYTES NFR BLD AUTO: 6 %
NEUTROPHILS # BLD AUTO: 4 10E3/UL (ref 1.6–8.3)
NEUTROPHILS NFR BLD AUTO: 72 %
NONHDLC SERPL-MCNC: 160 MG/DL
PLATELET # BLD AUTO: 218 10E3/UL (ref 150–450)
POTASSIUM SERPL-SCNC: 4.1 MMOL/L (ref 3.4–5.3)
PROT SERPL-MCNC: 7.5 G/DL (ref 6.4–8.3)
RBC # BLD AUTO: 4.12 10E6/UL (ref 3.8–5.2)
SODIUM SERPL-SCNC: 137 MMOL/L (ref 135–145)
TRIGL SERPL-MCNC: 103 MG/DL
VIT D+METAB SERPL-MCNC: 21 NG/ML (ref 20–50)
WBC # BLD AUTO: 5.6 10E3/UL (ref 4–11)

## 2023-11-14 PROCEDURE — 83036 HEMOGLOBIN GLYCOSYLATED A1C: CPT

## 2023-11-14 PROCEDURE — 85025 COMPLETE CBC W/AUTO DIFF WBC: CPT

## 2023-11-14 PROCEDURE — 80053 COMPREHEN METABOLIC PANEL: CPT

## 2023-11-14 PROCEDURE — 99396 PREV VISIT EST AGE 40-64: CPT

## 2023-11-14 PROCEDURE — 82306 VITAMIN D 25 HYDROXY: CPT

## 2023-11-14 PROCEDURE — 36415 COLL VENOUS BLD VENIPUNCTURE: CPT

## 2023-11-14 PROCEDURE — 80061 LIPID PANEL: CPT

## 2023-11-14 ASSESSMENT — ENCOUNTER SYMPTOMS
EYE PAIN: 0
SORE THROAT: 0
HEADACHES: 0
HEMATOCHEZIA: 0
SHORTNESS OF BREATH: 0
BREAST MASS: 0
ARTHRALGIAS: 0
COUGH: 0
DIARRHEA: 0
MYALGIAS: 0
NERVOUS/ANXIOUS: 0
PARESTHESIAS: 0
DIZZINESS: 0
CONSTIPATION: 0
DYSURIA: 0
CHILLS: 0
PALPITATIONS: 0
FREQUENCY: 0
ABDOMINAL PAIN: 0
NAUSEA: 1
JOINT SWELLING: 0
HEARTBURN: 0
WEAKNESS: 0
FEVER: 0
HEMATURIA: 0

## 2023-11-14 ASSESSMENT — PAIN SCALES - GENERAL: PAINLEVEL: NO PAIN (0)

## 2023-11-14 NOTE — PROGRESS NOTES
SUBJECTIVE:   CC: Shavon is an 63 year old who presents for preventive health visit.       11/14/2023     8:47 AM   Additional Questions   Roomed by Marlena Paul CMA   Accompanied by None         11/14/2023     8:47 AM   Patient Reported Additional Medications   Patient reports taking the following new medications none       Healthy Habits:     Getting at least 3 servings of Calcium per day:  Yes    Bi-annual eye exam:  NO    Dental care twice a year:  Yes    Sleep apnea or symptoms of sleep apnea:  None    Diet:  Regular (no restrictions)    Frequency of exercise:  6-7 days/week    Duration of exercise:  30-45 minutes    Taking medications regularly:  Yes    Medication side effects:  Other    Additional concerns today:  No            additional problems to add (Optional):[id number]}      Social History     Tobacco Use    Smoking status: Never    Smokeless tobacco: Never   Substance Use Topics    Alcohol use: Yes     Comment: 3 drinks per week              11/9/2023     7:11 AM   Alcohol Use   Prescreen: >3 drinks/day or >7 drinks/week? No     Reviewed orders with patient.  Reviewed health maintenance and updated orders accordingly - Yes  Lab work is in process    Breast Cancer Screening:    FHS-7:       11/9/2021     7:21 AM 6/16/2022     3:34 PM 11/14/2022     8:38 AM 7/26/2023    11:04 AM 11/9/2023     7:12 AM   Breast CA Risk Assessment (FHS-7)   Did any of your first-degree relatives have breast or ovarian cancer? Yes Yes Yes Yes Yes   Did any of your relatives have bilateral breast cancer? Unknown Unknown No No No   Did any man in your family have breast cancer? No No No No No   Did any woman in your family have breast and ovarian cancer? No No Yes No No   Did any woman in your family have breast cancer before age 50 y? No No No No No   Do you have 2 or more relatives with breast and/or ovarian cancer? Yes Yes Yes No No   Do you have 2 or more relatives with breast and/or bowel cancer? Yes No Yes No No        Mammogram Screening: Recommended mammography every 1-2 years with patient discussion and risk factor consideration  Pertinent mammograms are reviewed under the imaging tab.    History of abnormal Pap smear: NO - age 30-65 PAP every 5 years with negative HPV co-testing recommended      Latest Ref Rng & Units 11/14/2022     2:01 PM 2/12/2018     3:04 PM 2/12/2018     3:02 PM   PAP / HPV   PAP  Negative for Intraepithelial Lesion or Malignancy (NILM)      PAP (Historical)   NIL     HPV 16 DNA Negative Negative   Negative    HPV 18 DNA Negative Negative   Negative    Other HR HPV Negative Negative   Negative      Reviewed and updated as needed this visit by clinical staff   Tobacco  Allergies  Meds              Reviewed and updated as needed this visit by Provider                     Review of Systems   Constitutional:  Negative for chills and fever.   HENT:  Negative for congestion, ear pain, hearing loss and sore throat.    Eyes:  Negative for pain and visual disturbance.   Respiratory:  Negative for cough and shortness of breath.    Cardiovascular:  Negative for chest pain, palpitations and peripheral edema.   Gastrointestinal:  Positive for nausea. Negative for abdominal pain, constipation, diarrhea, heartburn and hematochezia.   Breasts:  Negative for tenderness, breast mass and discharge.   Genitourinary:  Negative for dysuria, frequency, genital sores, hematuria, pelvic pain, urgency, vaginal bleeding and vaginal discharge.   Musculoskeletal:  Negative for arthralgias, joint swelling and myalgias.   Skin:  Negative for rash.   Neurological:  Negative for dizziness, weakness, headaches and paresthesias.   Psychiatric/Behavioral:  Negative for mood changes. The patient is not nervous/anxious.           OBJECTIVE:   There were no vitals taken for this visit.  Physical Exam  Constitutional:       Appearance: Normal appearance.   HENT:      Head: Normocephalic.      Mouth/Throat:      Mouth: Mucous membranes  are moist.   Eyes:      Pupils: Pupils are equal, round, and reactive to light.   Cardiovascular:      Rate and Rhythm: Normal rate and regular rhythm.      Pulses: Normal pulses.      Heart sounds: Normal heart sounds. No murmur heard.     No gallop.   Pulmonary:      Effort: Pulmonary effort is normal. No respiratory distress.      Breath sounds: No wheezing.   Abdominal:      General: Bowel sounds are normal. There is no distension.      Tenderness: There is no abdominal tenderness. There is no guarding.   Musculoskeletal:         General: No swelling or deformity. Normal range of motion.   Skin:     General: Skin is warm.      Coloration: Skin is not jaundiced.      Findings: No bruising or lesion.   Neurological:      General: No focal deficit present.      Mental Status: She is alert and oriented to person, place, and time.   Psychiatric:         Mood and Affect: Mood normal.         Behavior: Behavior normal.           Diagnostic Test Results:  Labs reviewed in Epic  Results for orders placed or performed in visit on 11/14/23   Hemoglobin A1c     Status: Normal   Result Value Ref Range    Hemoglobin A1C 5.0 0.0 - 5.6 %   CBC with platelets and differential     Status: None   Result Value Ref Range    WBC Count 5.6 4.0 - 11.0 10e3/uL    RBC Count 4.12 3.80 - 5.20 10e6/uL    Hemoglobin 13.6 11.7 - 15.7 g/dL    Hematocrit 40.6 35.0 - 47.0 %    MCV 99 78 - 100 fL    MCH 33.0 26.5 - 33.0 pg    MCHC 33.5 31.5 - 36.5 g/dL    RDW 12.5 10.0 - 15.0 %    Platelet Count 218 150 - 450 10e3/uL    % Neutrophils 72 %    % Lymphocytes 21 %    % Monocytes 6 %    % Eosinophils 1 %    % Basophils 1 %    % Immature Granulocytes 0 %    Absolute Neutrophils 4.0 1.6 - 8.3 10e3/uL    Absolute Lymphocytes 1.2 0.8 - 5.3 10e3/uL    Absolute Monocytes 0.3 0.0 - 1.3 10e3/uL    Absolute Eosinophils 0.1 0.0 - 0.7 10e3/uL    Absolute Basophils 0.0 0.0 - 0.2 10e3/uL    Absolute Immature Granulocytes 0.0 <=0.4 10e3/uL   CBC with platelets and  differential     Status: None    Narrative    The following orders were created for panel order CBC with platelets and differential.  Procedure                               Abnormality         Status                     ---------                               -----------         ------                     CBC with platelets and d...[[id number]]                      Final result                 Please view results for these tests on the individual orders.       ASSESSMENT/PLAN:   (Z00.00) Routine general medical examination at a health care facility  (primary encounter diagnosis)  Comment: Patient was seen in clinic today for annual medical exam. Patient has recently been diagnosed with Parkinson's disease and is seeing neurologist tomorrow. Discussed with patient any new concerns and patient denies. Discussed need for prescription refills and labs recommended for this visit.  Plan: Comprehensive metabolic panel (BMP + Alb, Alk         Phos, ALT, AST, Total. Bili, TP),  Pending  CBC with  platelets and differential See results above            (K22.2) Esophageal stricture  Comment: No current concerns. Does continue to take omeprazole.  Plan: Continue omeprazole (PRILOSEC) 20 MG DR capsule        Refill sent to pharmacy    (Z13.220) Screening for cholesterol level  Comment: Discussed need to screen  Plan: Lipid panel reflex to direct LDL Fasting        Pending    (Z13.1) Screening for diabetes mellitus  Comment: Discussed need to screen  Plan: Hemoglobin A1c        Pending    (E55.9) Vitamin D deficiency  Comment: Discussed recommendation to screen.  Plan: Vitamin D Deficiency        Pending    Patient has been advised of split billing requirements and indicates understanding: Yes      COUNSELING:  Reviewed preventive health counseling, as reflected in patient instructions        She reports that she has never smoked. She has never used smokeless tobacco.          AUDRA Bonilla [facility]  [facility] [place]

## 2023-11-15 ENCOUNTER — OFFICE VISIT (OUTPATIENT)
Dept: NEUROLOGY | Facility: CLINIC | Age: 63
End: 2023-11-15
Payer: COMMERCIAL

## 2023-11-15 VITALS
OXYGEN SATURATION: 99 % | RESPIRATION RATE: 16 BRPM | BODY MASS INDEX: 23.73 KG/M2 | SYSTOLIC BLOOD PRESSURE: 150 MMHG | WEIGHT: 139 LBS | DIASTOLIC BLOOD PRESSURE: 94 MMHG | HEART RATE: 85 BPM

## 2023-11-15 DIAGNOSIS — G20.A2 PARKINSON'S DISEASE WITHOUT DYSKINESIA, WITH FLUCTUATING MANIFESTATIONS (H): Primary | ICD-10-CM

## 2023-11-15 PROCEDURE — 99214 OFFICE O/P EST MOD 30 MIN: CPT | Performed by: STUDENT IN AN ORGANIZED HEALTH CARE EDUCATION/TRAINING PROGRAM

## 2023-11-15 RX ORDER — CARBIDOPA AND LEVODOPA 25; 100 MG/1; MG/1
1.5-2 TABLET ORAL 3 TIMES DAILY
Qty: 540 TABLET | Refills: 3 | Status: SHIPPED | OUTPATIENT
Start: 2023-11-15 | End: 2024-05-24

## 2023-11-15 ASSESSMENT — UNIFIED PARKINSONS DISEASE RATING SCALE (UPDRS)
RIGIDITY_RUE: (2) MILD: RIGIDITY DETECTED WITHOUT THE ACTIVATION MANEUVER. FULL RANGE OF MOTION IS EASILY ACHIEVED.
PARKINSONS_MEDS: ON
FREEZING_GAIT: (0) NORMAL: NO FREEZING.
RIGIDITY_LLE: (1) SLIGHT: RIGIDITY ONLY DETECTED WITH ACTIVATION MANEUVER.
LEG_AGILITY_RIGHT: (0) NORMAL: NO PROBLEMS.
FINGER_TAPPING_LEFT: (1) SLIGHT: ANY OF THE FOLLOWING: A) THE REGULAR RHYTHM IS BROKEN WITH ONE WITH ONE OR TWO INTERRUPTIONS OR HESITATIONS OF THE MOVEMENT  B) SLIGHT SLOWING  C) THE AMPLITUDE DECREMENTS NEAR THE END OF THE 10 MOVEMENTS.
LEG_AGILITY_LEFT: (0) NORMAL: NO PROBLEMS.
HANDMOVEMENTS_LEFT: (0) NORMAL: NO PROBLEMS.
AMPLITUDE_LIP_JAW: (0) NORMAL: NO TREMOR.
SPONTANEITY_OF_MOVEMENT: (1) SLIGHT: SLIGHT GLOBAL SLOWNESS AND POVERTY OF SPONTANEOUS MOVEMENTS.
FACIAL_EXPRESSION: (0) NORMAL: NORMAL FACIAL EXPRESSION.
CONSTANCY_TREMOR_ATREST: (3) MODERATE: TREMOR AT REST IS PRESENT 51-75% OF THE ENTIRE EXAMINATION PERIOD.
ARISING_CHAIR: (0) NORMAL: NO PROBLEMS. ABLE TO ARISE QUICKLY WITHOUT HESITATION.
TOETAPPING_LEFT: (0) NORMAL: NO PROBLEMS.
AMPLITUDE_RLE: (0) NORMAL: NO TREMOR.
RIGIDITY_RLE: (0) NORMAL: NO RIGIDITY.
POSTURE: (1) SLIGHT: NOT QUITE ERECT, BUT COULD BE NORMAL FOR OLDER PERSONS.
RIGIDITY_LUE: (2) MILD: RIGIDITY DETECTED WITHOUT THE ACTIVATION MANEUVER. FULL RANGE OF MOTION IS EASILY ACHIEVED.
PRONATION_SUPINATION_RIGHT: (0) NORMAL: NO PROBLEMS.
POSTURAL_STABILITY: (0) NORMAL: NO PROBLEMS. RECOVERS WITH ONE OR TWO STEPS.
AMPLITUDE_LLE: (0) NORMAL: NO TREMOR.
TOETAPPING_RIGHT: (0) NORMAL: NO PROBLEMS.
TOTAL_SCORE: 4
TOTAL_SCORE: 16
FINGER_TAPPING_RIGHT: (0) NORMAL: NO PROBLEMS.
AMPLITUDE_LUE: (1) SLIGHT: < 1 CM IN MAXIMAL AMPLITUDE.
AXIAL_SCORE: 2
PRONATION_SUPINATION_LEFT: (0) NORMAL: NO PROBLEMS.
SPEECH: (0) NORMAL: NO SPEECH PROBLEMS.
AMPLITUDE_RUE: (0) NORMAL: NO TREMOR.
RIGIDITY_NECK: (0) NORMAL: NO RIGIDITY.
GAIT: (0) NORMAL: NO PROBLEMS.
DYSKINESIAS_PRESENT: NO
HANDMOVEMENTS_RIGHT: (0) NORMAL: NO PROBLEMS.
TOTAL_SCORE_LEFT: 7

## 2023-11-15 ASSESSMENT — PAIN SCALES - GENERAL: PAINLEVEL: NO PAIN (0)

## 2023-11-15 NOTE — PROGRESS NOTES
"Department of Neurology  Movement Disorders Division   Follow-up Note    Patient: Jeni Portillo   MRN: [id number]   : 1960   Date of Visit: 11/15/2023    INTERVAL EVENTS:  Jeni Portillo is a 63 year old female who returns to clinic for follow up of ET and PD.  She was last seen 8/15/2023 at which time DaTscan and CD/LD were ordered.    Having some nausea with the dose.  This is improved with eating.  Tremor improves with treatment.  Wears off with return of tremor.    She has noticed reduced output on the Peloton since started CD/LD.      Parkinson Disease Motor Symptom Review:  Motor fluctuations: yes  Dyskinesia: denies  Wearing off:  after 4 hrs  Freezing of gait: denies  Dystonia: denies  Tremor: LD responsive  Rigidity: left hand  Bradykinesia: denies     Parkinson's Disease Non-motor Symptom Review:  Mood - \"fine\"  Cognitive impairment -  denies  Sleep disturbances - denies, no dream enactment   GI symptoms - denies  Urinary symptoms - denies   Balance - good  Pain - denies  Autonomic dysfunction - denies  Hallucinations - denies      UPDRS Part II  2.1 Speech (P) 0 (P) (0) Normal: Not at all (no problems).   2.2 Saliva and drooling (P) 0 (P) (0) Normal: Not at all (no problems).   2.3 Chewing and swallowing (P) 0 (P) (0) Normal: No problems.   2.4 Eating tasks (P) 0 (P) (0) Normal: Not at all (no problems).   2.5 Dressing (P) 0 (P) (0) Normal: Not at all (no problems).   2.6 Hygiene (P) 0 (P) (0) Normal: Not at all (no problems).   2.7 Handwriting (P) 0 (P) (0) Normal: Not at all (no problems).   2.8 Doing hobbies and other activities (P) 0 (P) (0) Normal: Not at all (no problems).   2.9 Turning in bed (P) 0 (P) (0) Normal: Not at all (no problems).   2.10 Tremor (P) 1 (P) (1) Slight: Shaking or tremor occurs but does not cause problems with any activities.   2.11 Getting out of bed  (P) 0 (P) (0) Normal: Not at all (no problems).   2.12 Walking and balance  (P) 0 (P) (0) Normal: Not at all (no " problems).   2.13 Freezing (P) 0 (P) (0) Normal: Not at all (no problems).   Total (P) 1    Prior: 2 on 8/14/2023      Related Medications      CD/LD 25/100mg 1.5-2 1.5-2 1.5-2   Last taken at 12:30pm      Current Outpatient Medications   Medication Sig Dispense Refill    carbidopa-levodopa (SINEMET)  MG tablet Take 1.5-2 tablets by mouth 3 times daily for 360 days 540 tablet 3    docosanol (ABREVA) 10 % CREA Apply  topically 5 times daily. (Patient taking differently: Apply topically as needed) 1 Tube 5    fexofenadine (ALLEGRA) 60 MG tablet Take 1 tablet by mouth 2 times daily. (Patient taking differently: Take 60 mg by mouth as needed) 180 tablet 3    ibuprofen (ADVIL,MOTRIN) 200 MG tablet Take 1 tablet by mouth every 4 hours as needed for pain. 100 tablet 3    omeprazole (PRILOSEC) 20 MG DR capsule TAKE 1 CAPSULE BY MOUTH EVERY DAY 90 capsule 3       Allergies   Allergen Reactions    Nkda [No Known Drug Allergy]           PHYSICAL EXAM:  BP (!) 150/94   Pulse 85   Resp 16   Wt 63 kg (139 lb)   SpO2 99%   BMI 23.73 kg/m     8/15/2023  2:00 PM 11/15/2023  2:00 PM   UPDRS Motor Scale     Time: 14:30  14:44    Medication Off  On    R Brain DBS: None  None    L Brain DBS: None  None    Dyskinesia (LID) No  No    Did LID interfere No     Speech 0  0    Facial Expression 1  0    Rigidity Neck 0  0    Rigidity RUE 0  2    Rigidity LUE 2  2    Rigidity RLE 0  0    Rigidity LLE 0  1    Finger Taps R 0  0    Finger Taps L 1  1    Hand Mvt R 0  0    Hand Mvt L 1  0    Pron-/Supinate R 0  0    Pron-/Supinate L 1  0    Toe Tap R 0  0    Toe Tap L 1  0    Leg Agility R 0  0    Leg Agility L 0  0    Arise From Chair 0  0    Gait 1  0    Gait Freezing 0  0    Postural Stability 0  0    Posture 1  1    Global Spont Mvt 0  1    Postural Tremor RUE 0  1    Postural Tremor LUE 1  1    Kinetic Tremor RUE 0  1    Kinetic Tremor LUE 1  1    Rest Tremor RUE 0  0    Rest Tremor LUE 2  1    Rest Tremor RLE 0  0    Rest Tremor  LLE 0  0    Rest Tremor Lip/Jaw 0  0    Rest Tremor Constancy 4  3    Total Right 0  4    Total Left 10  7    Axial Total 3  2    Total 17  16        IMAGING:  DaTscan 10/12/2023 - personally reviewed: reduced uptake bilaterally          ASSESSMENT/PLAN:  Jeni Portillo is a 63 year old female who returns to clinic for follow up of ET and PD.  DaTscan was positive.  She has noticed improvement in her tremor with LE.  There is some wearing off and dose is limited by nausea.    - Continue CD/lD 1.5-2 pills TID  - RTC 6 months, 30 minutes    Kaley Wright MD   of Neurology  Movement Disorders Division

## 2023-11-15 NOTE — PATIENT INSTRUCTIONS
Keep taking the same dose of carbidopa/levodopa.    If it becomes less effective, let me know and we can increase the dose.    If you have more nausea, we can give you extra carbidopa.

## 2023-11-15 NOTE — LETTER
"11/15/2023       RE: Jeni Portillo  [address]  [place] [address]     Dear Colleague,    Thank you for referring your patient, Jeni Portillo, to the [facility] NEUROLOG[facility] [place] at [facility]. Please see a copy of my visit note below.    Department of Neurology  Movement Disorders Division   Follow-up Note    Patient: Jeni Portillo   MRN: [id number]   : 1960   Date of Visit: 11/15/2023    INTERVAL EVENTS:  Jeni Portillo is a 63 year old female who returns to clinic for follow up of ET and PD.  She was last seen 8/15/2023 at which time DaTscan and CD/LD were ordered.    Having some nausea with the dose.  This is improved with eating.  Tremor improves with treatment.  Wears off with return of tremor.    She has noticed reduced output on the Peloton since started CD/LD.      Parkinson Disease Motor Symptom Review:  Motor fluctuations: yes  Dyskinesia: denies  Wearing off:  after 4 hrs  Freezing of gait: denies  Dystonia: denies  Tremor: LD responsive  Rigidity: left hand  Bradykinesia: denies     Parkinson's Disease Non-motor Symptom Review:  Mood - \"fine\"  Cognitive impairment -  denies  Sleep disturbances - denies, no dream enactment   GI symptoms - denies  Urinary symptoms - denies   Balance - good  Pain - denies  Autonomic dysfunction - denies  Hallucinations - denies      UPDRS Part II  2.1 Speech (P) 0 (P) (0) Normal: Not at all (no problems).   2.2 Saliva and drooling (P) 0 (P) (0) Normal: Not at all (no problems).   2.3 Chewing and swallowing (P) 0 (P) (0) Normal: No problems.   2.4 Eating tasks (P) 0 (P) (0) Normal: Not at all (no problems).   2.5 Dressing (P) 0 (P) (0) Normal: Not at all (no problems).   2.6 Hygiene (P) 0 (P) (0) Normal: Not at all (no problems).   2.7 Handwriting (P) 0 (P) (0) Normal: Not at all (no problems).   2.8 Doing hobbies and other activities (P) 0 (P) (0) Normal: Not at all (no problems).   2.9 " Turning in bed (P) 0 (P) (0) Normal: Not at all (no problems).   2.10 Tremor (P) 1 (P) (1) Slight: Shaking or tremor occurs but does not cause problems with any activities.   2.11 Getting out of bed  (P) 0 (P) (0) Normal: Not at all (no problems).   2.12 Walking and balance  (P) 0 (P) (0) Normal: Not at all (no problems).   2.13 Freezing (P) 0 (P) (0) Normal: Not at all (no problems).   Total (P) 1    Prior: 2 on 8/14/2023      Related Medications      CD/LD 25/100mg 1.5-2 1.5-2 1.5-2   Last taken at 12:30pm      Current Outpatient Medications   Medication Sig Dispense Refill    carbidopa-levodopa (SINEMET)  MG tablet Take 1.5-2 tablets by mouth 3 times daily for 360 days 540 tablet 3    docosanol (ABREVA) 10 % CREA Apply  topically 5 times daily. (Patient taking differently: Apply topically as needed) 1 Tube 5    fexofenadine (ALLEGRA) 60 MG tablet Take 1 tablet by mouth 2 times daily. (Patient taking differently: Take 60 mg by mouth as needed) 180 tablet 3    ibuprofen (ADVIL,MOTRIN) 200 MG tablet Take 1 tablet by mouth every 4 hours as needed for pain. 100 tablet 3    omeprazole (PRILOSEC) 20 MG DR capsule TAKE 1 CAPSULE BY MOUTH EVERY DAY 90 capsule 3       Allergies   Allergen Reactions    Nkda [No Known Drug Allergy]           PHYSICAL EXAM:  BP (!) 150/94   Pulse 85   Resp 16   Wt 63 kg (139 lb)   SpO2 99%   BMI 23.73 kg/m     8/15/2023  2:00 PM 11/15/2023  2:00 PM   UPDRS Motor Scale     Time: 14:30  14:44    Medication Off  On    R Brain DBS: None  None    L Brain DBS: None  None    Dyskinesia (LID) No  No    Did LID interfere No     Speech 0  0    Facial Expression 1  0    Rigidity Neck 0  0    Rigidity RUE 0  2    Rigidity LUE 2  2    Rigidity RLE 0  0    Rigidity LLE 0  1    Finger Taps R 0  0    Finger Taps L 1  1    Hand Mvt R 0  0    Hand Mvt L 1  0    Pron-/Supinate R 0  0    Pron-/Supinate L 1  0    Toe Tap R 0  0    Toe Tap L 1  0    Leg Agility R 0  0    Leg Agility L 0  0    Arise From  Chair 0  0    Gait 1  0    Gait Freezing 0  0    Postural Stability 0  0    Posture 1  1    Global Spont Mvt 0  1    Postural Tremor RUE 0  1    Postural Tremor LUE 1  1    Kinetic Tremor RUE 0  1    Kinetic Tremor LUE 1  1    Rest Tremor RUE 0  0    Rest Tremor LUE 2  1    Rest Tremor RLE 0  0    Rest Tremor LLE 0  0    Rest Tremor Lip/Jaw 0  0    Rest Tremor Constancy 4  3    Total Right 0  4    Total Left 10  7    Axial Total 3  2    Total 17  16        IMAGING:  DaTscan 10/12/2023 - personally reviewed: reduced uptake bilaterally          ASSESSMENT/PLAN:  Jeni Portillo is a 63 year old female who returns to clinic for follow up of ET and PD.  DaTscan was positive.  She has noticed improvement in her tremor with LE.  There is some wearing off and dose is limited by nausea.    - Continue CD/lD 1.5-2 pills TID  - RTC 6 months, 30 minutes        Again, thank you for allowing me to participate in the care of your patient.      Sincerely,    Kaley Wright MD

## 2024-05-20 ENCOUNTER — OFFICE VISIT (OUTPATIENT)
Dept: NEUROLOGY | Facility: CLINIC | Age: 64
End: 2024-05-20
Payer: COMMERCIAL

## 2024-05-20 VITALS
DIASTOLIC BLOOD PRESSURE: 66 MMHG | HEART RATE: 95 BPM | RESPIRATION RATE: 16 BRPM | OXYGEN SATURATION: 99 % | SYSTOLIC BLOOD PRESSURE: 115 MMHG

## 2024-05-20 DIAGNOSIS — G20.A2 PARKINSON'S DISEASE WITHOUT DYSKINESIA, WITH FLUCTUATING MANIFESTATIONS (H): Primary | ICD-10-CM

## 2024-05-20 PROCEDURE — 99214 OFFICE O/P EST MOD 30 MIN: CPT | Performed by: STUDENT IN AN ORGANIZED HEALTH CARE EDUCATION/TRAINING PROGRAM

## 2024-05-20 PROCEDURE — G2211 COMPLEX E/M VISIT ADD ON: HCPCS | Performed by: STUDENT IN AN ORGANIZED HEALTH CARE EDUCATION/TRAINING PROGRAM

## 2024-05-20 ASSESSMENT — UNIFIED PARKINSONS DISEASE RATING SCALE (UPDRS)
CONSTANCY_TREMOR_ATREST: (2) MILD: TREMOR AT REST IS PRESENT 25-50% OF THE ENTIRE EXAMINATION PERIOD.
PRONATION_SUPINATION_RIGHT: (0) NORMAL: NO PROBLEMS.
LEG_AGILITY_RIGHT: (0) NORMAL: NO PROBLEMS.
RIGIDITY_RUE: (2) MILD: RIGIDITY DETECTED WITHOUT THE ACTIVATION MANEUVER. FULL RANGE OF MOTION IS EASILY ACHIEVED.
GAIT: (0) NORMAL: NO PROBLEMS.
RIGIDITY_NECK: (0) NORMAL: NO RIGIDITY.
TOETAPPING_RIGHT: (1) SLIGHT: ANY OF THE FOLLOWING: A) THE REGULAR RHYTHM IS BROKEN WITH ONE WITH ONE OR TWO INTERRUPTIONS OR HESITATIONS OF THE MOVEMENT  B) SLIGHT SLOWING  C) THE AMPLITUDE DECREMENTS NEAR THE END OF THE 10 MOVEMENTS.
POSTURE: (0) NORMAL: NO PROBLEMS.
FINGER_TAPPING_RIGHT: (0) NORMAL: NO PROBLEMS.
AMPLITUDE_LUE: (1) SLIGHT: < 1 CM IN MAXIMAL AMPLITUDE.
HANDMOVEMENTS_LEFT: (0) NORMAL: NO PROBLEMS.
DYSKINESIAS_PRESENT: NO
RIGIDITY_LUE: (2) MILD: RIGIDITY DETECTED WITHOUT THE ACTIVATION MANEUVER. FULL RANGE OF MOTION IS EASILY ACHIEVED.
AMPLITUDE_RLE: (0) NORMAL: NO TREMOR.
TOETAPPING_LEFT: (1) SLIGHT: ANY OF THE FOLLOWING: A) THE REGULAR RHYTHM IS BROKEN WITH ONE WITH ONE OR TWO INTERRUPTIONS OR HESITATIONS OF THE MOVEMENT  B) SLIGHT SLOWING  C) THE AMPLITUDE DECREMENTS NEAR THE END OF THE 10 MOVEMENTS.
AMPLITUDE_LIP_JAW: (0) NORMAL: NO TREMOR.
PARKINSONS_MEDS: ON
AMPLITUDE_LLE: (0) NORMAL: NO TREMOR.
TOTAL_SCORE_LEFT: 8
PRONATION_SUPINATION_LEFT: (0) NORMAL: NO PROBLEMS.
LEG_AGILITY_LEFT: (0) NORMAL: NO PROBLEMS.
TOTAL_SCORE: 6
RIGIDITY_RLE: (1) SLIGHT: RIGIDITY ONLY DETECTED WITH ACTIVATION MANEUVER.
FACIAL_EXPRESSION: (0) NORMAL: NORMAL FACIAL EXPRESSION.
POSTURAL_STABILITY: (0) NORMAL: NO PROBLEMS. RECOVERS WITH ONE OR TWO STEPS.
ARISING_CHAIR: (0) NORMAL: NO PROBLEMS. ABLE TO ARISE QUICKLY WITHOUT HESITATION.
FREEZING_GAIT: (0) NORMAL: NO FREEZING.
TOTAL_SCORE: 16
HANDMOVEMENTS_RIGHT: (0) NORMAL: NO PROBLEMS.
AXIAL_SCORE: 0
RIGIDITY_LLE: (1) SLIGHT: RIGIDITY ONLY DETECTED WITH ACTIVATION MANEUVER.
SPEECH: (0) NORMAL: NO SPEECH PROBLEMS.
FINGER_TAPPING_LEFT: (1) SLIGHT: ANY OF THE FOLLOWING: A) THE REGULAR RHYTHM IS BROKEN WITH ONE WITH ONE OR TWO INTERRUPTIONS OR HESITATIONS OF THE MOVEMENT  B) SLIGHT SLOWING  C) THE AMPLITUDE DECREMENTS NEAR THE END OF THE 10 MOVEMENTS.
AMPLITUDE_RUE: (0) NORMAL: NO TREMOR.
SPONTANEITY_OF_MOVEMENT: (0) NORMAL: NO PROBLEMS.

## 2024-05-20 ASSESSMENT — MOVEMENT DISORDERS SOCIETY - UNIFIED PARKINSONS DISEASE RATING SCALE (MDS-UPDRS)
HYGIENE: (0) NORMAL: NOT AT ALL (NO PROBLEMS).
HANDWRITING: (1) SLIGHT: MY WRITING IS SLOW, CLUMSY OR UNEVEN, BUT ALL WORDS ARE CLEAR.
FREEZING: (0) NORMAL: NOT AT ALL (NO PROBLEMS).
TURNING_IN_BED: (0) NORMAL: NOT AT ALL (NO PROBLEMS).
HOBBIES_AND_OTHER_ACTIVITIES: (0) NORMAL: NOT AT ALL (NO PROBLEMS).
TREMOR: (2) MILD: SHAKING OR TREMOR CAUSES PROBLEMS WITH ONLY A FEW ACTIVITIES.
WALKING_AND_BALANCE: (0) NORMAL: NOT AT ALL (NO PROBLEMS).
SPEECH: (0) NORMAL: NOT AT ALL (NO PROBLEMS).
DRESSING: (0) NORMAL: NOT AT ALL (NO PROBLEMS).
TOTAL_SCORE: 3
GETTING_OUT_OF_BED_CAR_DEEP_CHAIR: (0) NORMAL: NOT AT ALL (NO PROBLEMS).
CHEWING_AND_SWALLOWING: (0) NORMAL: NO PROBLEMS.
EATING_TASKS: (0) NORMAL: NOT AT ALL (NO PROBLEMS).
SALIVA_AND_DROOLING: (0) NORMAL: NOT AT ALL (NO PROBLEMS).

## 2024-05-20 ASSESSMENT — PAIN SCALES - GENERAL: PAINLEVEL: NO PAIN (0)

## 2024-05-20 NOTE — PATIENT INSTRUCTIONS
If you're noticing a lot of tremor in the morning before your pills, we can add a bedtime dose of long acting carbidopa/levodopa.

## 2024-05-20 NOTE — LETTER
"2024       RE: Jeni Portillo  [address]  [facility] [address]       Dear Colleague,    Thank you for referring your patient, Jeni Portillo, to the [facility] NEUROLOGY CLINIC [place] at [facility]. Please see a copy of my visit note below.    Department of Neurology  Movement Disorders Division   Follow-up Note    Patient: Jeni Portillo   MRN: [id number]   : 1960   Date of Visit: 2024    INTERVAL EVENTS:  Jeni Portillo is a 63 year old female who returns to clinic for follow up of ET and PD.  she was last seen 11/15/2023 at which time there were no medication changes.  She reports that her symptoms are stable.    She sometimes skips the PM dose because tremor is often better at [facility].      Parkinson Disease Motor Symptom Review:  Dyskinesia: denies  Wearing off: after 4 hours  Freezing of gait: denies  Dystonia: denies  Tremor: LD responsive     Parkinson's Disease Non-motor Symptom Review:  Mood - \"good\"  Cognitive impairment -  denies  Sleep disturbances - denies, endorses night sweats, no dream enactment   GI symptoms - denies constipation  Urinary symptoms - denies   Balance - denies difficulty  Autonomic dysfunction - denies  Hallucinations - denies       Part II  2.1 Speech 0 (0) Normal: Not at all (no problems).   2.2 Saliva and drooling 0 (0) Normal: Not at all (no problems).   2.3 Chewing and swallowing 0 (0) Normal: No problems.   2.4 Eating tasks 0 (0) Normal: Not at all (no problems).   2.5 Dressing 0 (0) Normal: Not at all (no problems).   2.6 Hygiene 0 (0) Normal: Not at all (no problems).   2.7 Handwriting 1 (when off meds) (1) Slight: My writing is slow, clumsy or uneven, but all words are clear. (when off meds)   2.8 Doing hobbies and other activities 0 (0) Normal: Not at all (no problems).   2.9 Turning in bed 0 (0) Normal: Not at all (no problems).   2.10 Tremor 2 (2) Mild: Shaking or tremor causes problems " with only a few activities.   2.11 Getting out of bed  0 (0) Normal: Not at all (no problems).   2.12 Walking and balance  0 (0) Normal: Not at all (no problems).   2.13 Freezing 0 (0) Normal: Not at all (no problems).   Total 3    Prior: 1 on 11/9/2023      Related Medications  6am 10am 1pm  6pm    CD/LD 25/100mg 2 1 2 0-2   Last taken at 12pm      Current Outpatient Medications   Medication Sig Dispense Refill    carbidopa-levodopa (SINEMET)  MG tablet Take 1.5-2 tablets by mouth 3 times daily for 360 days 540 tablet 3    docosanol (ABREVA) 10 % CREA Apply  topically 5 times daily. (Patient taking differently: Apply topically as needed) 1 Tube 5    fexofenadine (ALLEGRA) 60 MG tablet Take 1 tablet by mouth 2 times daily. (Patient taking differently: Take 60 mg by mouth as needed) 180 tablet 3    ibuprofen (ADVIL,MOTRIN) 200 MG tablet Take 1 tablet by mouth every 4 hours as needed for pain. 100 tablet 3    omeprazole (PRILOSEC) 20 MG DR capsule TAKE 1 CAPSULE BY MOUTH EVERY DAY 90 capsule 3       Allergies   Allergen Reactions    Nkda [No Known Drug Allergy]           PHYSICAL EXAM:  /66 (BP Location: Right arm, Patient Position: Sitting, Cuff Size: Adult Regular)   Pulse 95   Resp 16   SpO2 99%    11/15/2023  2:00 PM 5/20/2024  12:00 PM   UPDRS Motor Scale     Time: 14:44  12:58    Medication On  On    R Brain DBS: None  None    L Brain DBS: None  None    Dyskinesia (LID) No  No    Did LID interfere     Speech 0  0    Facial Expression 0  0    Rigidity Neck 0  0    Rigidity RUE 2  2    Rigidity LUE 2  2    Rigidity RLE 0  1    Rigidity LLE 1  1    Finger Taps R 0  0    Finger Taps L 1  1    Hand Mvt R 0  0    Hand Mvt L 0  0    Pron-/Supinate R 0  0    Pron-/Supinate L 0  0    Toe Tap R 0  1    Toe Tap L 0  1    Leg Agility R 0  0    Leg Agility L 0  0    Arise From Chair 0  0    Gait 0  0    Gait Freezing 0  0    Postural Stability 0  0    Posture 1  0    Global Spont Mvt 1  0    Postural Tremor  RUE 1  1    Postural Tremor LUE 1  1    Kinetic Tremor RUE 1  1    Kinetic Tremor LUE 1  1    Rest Tremor RUE 0  0    Rest Tremor LUE 1  1    Rest Tremor RLE 0  0    Rest Tremor LLE 0  0    Rest Tremor Lip/Jaw 0  0    Rest Tremor Constancy 3  2    Total Right 4  6    Total Left 7  8    Axial Total 2  0    Total 16  16            ASSESSMENT/PLAN:  Ms. Portillo is a 62 yo woman with PD and ET who returns for follow-up.  She reports wearing off with return of tremor after 4 hours but is otherwise doing well on her current regimen.  We discussed the option to change the evening dose to CR to improve morning tremor but she declined at this time.    The longitudinal plan of care for the diagnosis(es)/condition(s) as documented were addressed during this visit. Due to the added complexity in care, I will continue to support Shavon in the subsequent management and with ongoing continuity of care.    - Continue CD/LD as is  Related Medications  6am 10am 1pm  6pm    CD/LD 25/100mg 2 1 2 0-2   - RTC 1 yr, 60 minutes at [facility]        Again, thank you for allowing me to participate in the care of your patient.      Sincerely,    Kaley Wright MD

## 2024-05-20 NOTE — NURSING NOTE
Chief Complaint   Patient presents with    RECHECK     /66 (BP Location: Right arm, Patient Position: Sitting, Cuff Size: Adult Regular)   Pulse 95   Resp 16   SpO2 99%     GEOVANNA FEI

## 2024-05-20 NOTE — PROGRESS NOTES
"Department of Neurology  Movement Disorders Division   Follow-up Note    Patient: Jeni Portillo   MRN: [id number]   : 1960   Date of Visit: 2024    INTERVAL EVENTS:  Jeni Portillo is a 63 year old female who returns to clinic for follow up of ET and PD.  she was last seen 11/15/2023 at which time there were no medication changes.  She reports that her symptoms are stable.    She sometimes skips the PM dose because tremor is often better at [facility].      Parkinson Disease Motor Symptom Review:  Dyskinesia: denies  Wearing off: after 4 hours  Freezing of gait: denies  Dystonia: denies  Tremor: LD responsive     Parkinson's Disease Non-motor Symptom Review:  Mood - \"good\"  Cognitive impairment -  denies  Sleep disturbances - denies, endorses night sweats, no dream enactment   GI symptoms - denies constipation  Urinary symptoms - denies   Balance - denies difficulty  Autonomic dysfunction - denies  Hallucinations - denies       Part II  2.1 Speech 0 (0) Normal: Not at all (no problems).   2.2 Saliva and drooling 0 (0) Normal: Not at all (no problems).   2.3 Chewing and swallowing 0 (0) Normal: No problems.   2.4 Eating tasks 0 (0) Normal: Not at all (no problems).   2.5 Dressing 0 (0) Normal: Not at all (no problems).   2.6 Hygiene 0 (0) Normal: Not at all (no problems).   2.7 Handwriting 1 (when off meds) (1) Slight: My writing is slow, clumsy or uneven, but all words are clear. (when off meds)   2.8 Doing hobbies and other activities 0 (0) Normal: Not at all (no problems).   2.9 Turning in bed 0 (0) Normal: Not at all (no problems).   2.10 Tremor 2 (2) Mild: Shaking or tremor causes problems with only a few activities.   2.11 Getting out of bed  0 (0) Normal: Not at all (no problems).   2.12 Walking and balance  0 (0) Normal: Not at all (no problems).   2.13 Freezing 0 (0) Normal: Not at all (no problems).   Total 3    Prior: 1 on 2023      Related Medications  6am 10am 1pm  6pm    CD/LD 25/100mg 2 1 2 " 0-2   Last taken at 12pm      Current Outpatient Medications   Medication Sig Dispense Refill    carbidopa-levodopa (SINEMET)  MG tablet Take 1.5-2 tablets by mouth 3 times daily for 360 days 540 tablet 3    docosanol (ABREVA) 10 % CREA Apply  topically 5 times daily. (Patient taking differently: Apply topically as needed) 1 Tube 5    fexofenadine (ALLEGRA) 60 MG tablet Take 1 tablet by mouth 2 times daily. (Patient taking differently: Take 60 mg by mouth as needed) 180 tablet 3    ibuprofen (ADVIL,MOTRIN) 200 MG tablet Take 1 tablet by mouth every 4 hours as needed for pain. 100 tablet 3    omeprazole (PRILOSEC) 20 MG DR capsule TAKE 1 CAPSULE BY MOUTH EVERY DAY 90 capsule 3       Allergies   Allergen Reactions    Nkda [No Known Drug Allergy]           PHYSICAL EXAM:  /66 (BP Location: Right arm, Patient Position: Sitting, Cuff Size: Adult Regular)   Pulse 95   Resp 16   SpO2 99%    11/15/2023  2:00 PM 5/20/2024  12:00 PM   UPDRS Motor Scale     Time: 14:44  12:58    Medication On  On    R Brain DBS: None  None    L Brain DBS: None  None    Dyskinesia (LID) No  No    Did LID interfere     Speech 0  0    Facial Expression 0  0    Rigidity Neck 0  0    Rigidity RUE 2  2    Rigidity LUE 2  2    Rigidity RLE 0  1    Rigidity LLE 1  1    Finger Taps R 0  0    Finger Taps L 1  1    Hand Mvt R 0  0    Hand Mvt L 0  0    Pron-/Supinate R 0  0    Pron-/Supinate L 0  0    Toe Tap R 0  1    Toe Tap L 0  1    Leg Agility R 0  0    Leg Agility L 0  0    Arise From Chair 0  0    Gait 0  0    Gait Freezing 0  0    Postural Stability 0  0    Posture 1  0    Global Spont Mvt 1  0    Postural Tremor RUE 1  1    Postural Tremor LUE 1  1    Kinetic Tremor RUE 1  1    Kinetic Tremor LUE 1  1    Rest Tremor RUE 0  0    Rest Tremor LUE 1  1    Rest Tremor RLE 0  0    Rest Tremor LLE 0  0    Rest Tremor Lip/Jaw 0  0    Rest Tremor Constancy 3  2    Total Right 4  6    Total Left 7  8    Axial Total 2  0    Total 16  16             ASSESSMENT/PLAN:  Ms. Portillo is a 62 yo woman with PD and ET who returns for follow-up.  She reports wearing off with return of tremor after 4 hours but is otherwise doing well on her current regimen.  We discussed the option to change the evening dose to CR to improve morning tremor but she declined at this time.    The longitudinal plan of care for the diagnosis(es)/condition(s) as documented were addressed during this visit. Due to the added complexity in care, I will continue to support Shavon in the subsequent management and with ongoing continuity of care.    - Continue CD/LD as is  Related Medications  6am 10am 1pm  6pm    CD/LD 25/100mg 2 1 2 0-2   - RTC 1 yr, 60 minutes at [facility]      Kaley Wright MD   of Neurology  Movement Disorders Division

## 2024-05-24 ENCOUNTER — MYC MEDICAL ADVICE (OUTPATIENT)
Dept: NEUROLOGY | Facility: CLINIC | Age: 64
End: 2024-05-24
Payer: COMMERCIAL

## 2024-05-24 DIAGNOSIS — G20.A2 PARKINSON'S DISEASE WITHOUT DYSKINESIA, WITH FLUCTUATING MANIFESTATIONS (H): ICD-10-CM

## 2024-05-28 RX ORDER — CARBIDOPA AND LEVODOPA 25; 100 MG/1; MG/1
1.5-2 TABLET ORAL 3 TIMES DAILY
Qty: 540 TABLET | Refills: 3 | Status: SHIPPED | OUTPATIENT
Start: 2024-05-28

## 2024-06-26 ENCOUNTER — PATIENT OUTREACH (OUTPATIENT)
Dept: CARE COORDINATION | Facility: CLINIC | Age: 64
End: 2024-06-26

## 2024-07-24 ENCOUNTER — PATIENT OUTREACH (OUTPATIENT)
Dept: CARE COORDINATION | Facility: CLINIC | Age: 64
End: 2024-07-24

## 2024-08-12 ENCOUNTER — ANCILLARY PROCEDURE (OUTPATIENT)
Dept: MAMMOGRAPHY | Facility: CLINIC | Age: 64
End: 2024-08-12
Attending: PHYSICIAN ASSISTANT
Payer: MEDICARE

## 2024-08-12 DIAGNOSIS — Z12.31 VISIT FOR SCREENING MAMMOGRAM: ICD-10-CM

## 2024-08-12 PROCEDURE — 77067 SCR MAMMO BI INCL CAD: CPT | Mod: TC | Performed by: RADIOLOGY

## 2024-08-12 PROCEDURE — 77063 BREAST TOMOSYNTHESIS BI: CPT | Mod: TC | Performed by: RADIOLOGY

## 2024-11-02 NOTE — PROGRESS NOTES
ASSESSMENT & PLAN    Shavon was seen today for pain.    Diagnoses and all orders for this visit:    Lateral pain of left hip    Acute hip pain, left  -     XR Pelvis and Hip Left 1 View; Future        See Patient Instructions  Patient Instructions   Left lateral hip pain most consistent with gluteal tendon source. This does not appear to be hip joint related.  Discussed rehab approach; home exercises reviewed today. If interested in referral to physical therapy, contact clinic.  Additional considerations may include use of medication for symptoms (over the counter medication is ok), additional imaging with MRI (not required currently), future consideration for lateral hip steroid injection.  Start with home exercises 3-4 weeks to begin. If not improving, likely would refer to physical therapy next.    If you have any further questions for your physician or physician s care team you can contact them thru [email or web address] or by calling [phone].      Cody Jimenes [facility]    -----  Chief Complaint   Patient presents with    Left Hip - Pain       SUBJECTIVE  Jeni Portillo is a/an 64 year old female who is seen as a self referral for evaluation of left hip.     The patient is seen by themselves.    Onset: 3 week(s) ago. Reports insidious onset without acute precipitating event. Does note that she did a hike that was 1,000 stairs around [place] [employer]. 15 hour flight to [facility].  Location of Pain: left hip, lateral hip and into glute  Worsened by: going up stairs, first few steps then relieved after movement  Better with: Pressure on lateral hip  Treatments tried: Yoga and home stretching, advil, foam rolling  Associated symptoms: no distal numbness or tingling; denies swelling or warmth    Orthopedic/Surgical history: NO  Social History/Occupation: Retired      **  Above information per rooming staff.  Additional history:  No pain during hiking or right after. However,  notes pain after that time.  Pain posterolateral left hip, sometimes up to SI joint level.  Pain with ascending stairs, also pain first getting up to walk.        REVIEW OF SYSTEMS:  Review of Systems    OBJECTIVE:       Left hip exam    ROM:     Full active and passive ROM   No pain with passive motion  Some lateral, posterior pain with active abduction    Strength: no change with resisted flexion, abduction, adduction    Tender: none focal currently    Non Tender: GT    Special Tests:      neg (-) KAVITHA       neg (-) FADIR       Lateral pain with Melo       Log roll neg      RADIOLOGY:  Final results and radiologist's interpretation, available in the [facility] health record.  Images were reviewed with the patient in the office today.  My personal interpretation of the performed imaging: no acute bony abnormality noted. Hip joint spaces appear preserved.        Recent Results (from the past 24 hours)   XR Pelvis and Hip Left 1 View    Narrative    XR PELVIS AND HIP LEFT 1 VIEW  11/5/2024 3:49 PM     HISTORY: Lateral hip pain, rule out arthritic changes or vinh  deformity; Acute hip pain, left  COMPARISON: None      Impression    IMPRESSION:  No acute fracture or subluxation. Mild bilateral hip osteoarthritis.  Demineralization.    MICHAEL GOODMAN MD         SYSTEM ID:  [id number]

## 2024-11-05 ENCOUNTER — OFFICE VISIT (OUTPATIENT)
Dept: ORTHOPEDICS | Facility: CLINIC | Age: 64
End: 2024-11-05
Payer: MEDICARE

## 2024-11-05 ENCOUNTER — ANCILLARY PROCEDURE (OUTPATIENT)
Dept: GENERAL RADIOLOGY | Facility: CLINIC | Age: 64
End: 2024-11-05
Attending: PEDIATRICS
Payer: MEDICARE

## 2024-11-05 DIAGNOSIS — M25.552 ACUTE HIP PAIN, LEFT: ICD-10-CM

## 2024-11-05 DIAGNOSIS — M25.552 LATERAL PAIN OF LEFT HIP: Primary | ICD-10-CM

## 2024-11-05 PROCEDURE — 99203 OFFICE O/P NEW LOW 30 MIN: CPT | Performed by: PEDIATRICS

## 2024-11-05 PROCEDURE — 73502 X-RAY EXAM HIP UNI 2-3 VIEWS: CPT | Mod: TC | Performed by: INTERNAL MEDICINE

## 2024-11-05 NOTE — LETTER
11/5/2024      Jein Portillo  [address]  [place] [address]      Dear Colleague,    Thank you for referring your patient, Jeni Portillo, to the [facility]. Please see a copy of my visit note below.    ASSESSMENT & PLAN    Shavon was seen today for pain.    Diagnoses and all orders for this visit:    Lateral pain of left hip    Acute hip pain, left  -     XR Pelvis and Hip Left 1 View; Future        See Patient Instructions  Patient Instructions   Left lateral hip pain most consistent with gluteal tendon source. This does not appear to be hip joint related.  Discussed rehab approach; home exercises reviewed today. If interested in referral to physical therapy, contact clinic.  Additional considerations may include use of medication for symptoms (over the counter medication is ok), additional imaging with MRI (not required currently), future consideration for lateral hip steroid injection.  Start with home exercises 3-4 weeks to begin. If not improving, likely would refer to physical therapy next.    If you have any further questions for your physician or physician s care team you can contact them thru [email or web address] or by calling [phone].      Cody Jimenes DO  [facility]    -----  Chief Complaint   Patient presents with     Left Hip - Pain       SUBJECTIVE  Jeni Portillo is a/an 64 year old female who is seen as a self referral for evaluation of left hip.     The patient is seen by themselves.    Onset: 3 week(s) ago. Reports insidious onset without acute precipitating event. Does note that she did a hike that was 1,000 stairs around an [employer]. 15 hour flight to [facility].  Location of Pain: left hip, lateral hip and into glute  Worsened by: going up stairs, first few steps then relieved after movement  Better with: Pressure on lateral hip  Treatments tried: Yoga and home stretching, advil, foam  rolling  Associated symptoms: no distal numbness or tingling; denies swelling or warmth    Orthopedic/Surgical history: NO  Social History/Occupation: Retired      **  Above information per rooming staff.  Additional history:  No pain during hiking or right after. However, notes pain after that time.  Pain posterolateral left hip, sometimes up to SI joint level.  Pain with ascending stairs, also pain first getting up to walk.        REVIEW OF SYSTEMS:  Review of Systems    OBJECTIVE:       Left hip exam    ROM:     Full active and passive ROM   No pain with passive motion  Some lateral, posterior pain with active abduction    Strength: no change with resisted flexion, abduction, adduction    Tender: none focal currently    Non Tender: GT    Special Tests:      neg (-) KAVITHA       neg (-) FADIR       Lateral pain with Melo       Log roll neg      RADIOLOGY:  Final results and radiologist's interpretation, available in the [facility] health record.  Images were reviewed with the patient in the office today.  My personal interpretation of the performed imaging: no acute bony abnormality noted. Hip joint spaces appear preserved.        Recent Results (from the past 24 hours)   XR Pelvis and Hip Left 1 View    Narrative    XR PELVIS AND HIP LEFT 1 VIEW  11/5/2024 3:49 PM     HISTORY: Lateral hip pain, rule out arthritic changes or vinh  deformity; Acute hip pain, left  COMPARISON: None      Impression    IMPRESSION:  No acute fracture or subluxation. Mild bilateral hip osteoarthritis.  Demineralization.    MICHAEL GOODMAN MD         SYSTEM ID:  [id number]              Again, thank you for allowing me to participate in the care of your patient.        Sincerely,        Cody Jimenes DO

## 2024-11-05 NOTE — PATIENT INSTRUCTIONS
Left lateral hip pain most consistent with gluteal tendon source. This does not appear to be hip joint related.  Discussed rehab approach; home exercises reviewed today. If interested in referral to physical therapy, contact clinic.  Additional considerations may include use of medication for symptoms (over the counter medication is ok), additional imaging with MRI (not required currently), future consideration for lateral hip steroid injection.  Start with home exercises 3-4 weeks to begin. If not improving, likely would refer to physical therapy next.    If you have any further questions for your physician or physician s care team you can contact them thru [email or web address] or by calling [phone].

## 2024-11-17 SDOH — HEALTH STABILITY: PHYSICAL HEALTH: ON AVERAGE, HOW MANY DAYS PER WEEK DO YOU ENGAGE IN MODERATE TO STRENUOUS EXERCISE (LIKE A BRISK WALK)?: 6 DAYS

## 2024-11-17 SDOH — HEALTH STABILITY: PHYSICAL HEALTH: ON AVERAGE, HOW MANY MINUTES DO YOU ENGAGE IN EXERCISE AT THIS LEVEL?: 30 MIN

## 2024-11-17 ASSESSMENT — SOCIAL DETERMINANTS OF HEALTH (SDOH): HOW OFTEN DO YOU GET TOGETHER WITH FRIENDS OR RELATIVES?: MORE THAN THREE TIMES A WEEK

## 2024-11-18 ENCOUNTER — OFFICE VISIT (OUTPATIENT)
Dept: FAMILY MEDICINE | Facility: CLINIC | Age: 64
End: 2024-11-18
Payer: MEDICARE

## 2024-11-18 VITALS
OXYGEN SATURATION: 100 % | BODY MASS INDEX: 22.91 KG/M2 | HEIGHT: 64 IN | HEART RATE: 99 BPM | SYSTOLIC BLOOD PRESSURE: 138 MMHG | RESPIRATION RATE: 19 BRPM | TEMPERATURE: 98.1 F | DIASTOLIC BLOOD PRESSURE: 82 MMHG | WEIGHT: 134.2 LBS

## 2024-11-18 DIAGNOSIS — E55.9 VITAMIN D DEFICIENCY: ICD-10-CM

## 2024-11-18 DIAGNOSIS — Z00.00 ENCOUNTER FOR MEDICARE ANNUAL WELLNESS EXAM: Primary | ICD-10-CM

## 2024-11-18 DIAGNOSIS — Z78.0 ASYMPTOMATIC MENOPAUSAL STATE: ICD-10-CM

## 2024-11-18 DIAGNOSIS — K22.2 ESOPHAGEAL STRICTURE: ICD-10-CM

## 2024-11-18 DIAGNOSIS — Z13.220 SCREENING FOR HYPERLIPIDEMIA: ICD-10-CM

## 2024-11-18 LAB
CHOLEST SERPL-MCNC: 287 MG/DL
FASTING STATUS PATIENT QL REPORTED: NO
HDLC SERPL-MCNC: 127 MG/DL
LDLC SERPL CALC-MCNC: 145 MG/DL
NONHDLC SERPL-MCNC: 160 MG/DL
TRIGL SERPL-MCNC: 74 MG/DL
VIT D+METAB SERPL-MCNC: 25 NG/ML (ref 20–50)

## 2024-11-18 PROCEDURE — 82306 VITAMIN D 25 HYDROXY: CPT | Performed by: PHYSICIAN ASSISTANT

## 2024-11-18 PROCEDURE — G0009 ADMIN PNEUMOCOCCAL VACCINE: HCPCS | Performed by: PHYSICIAN ASSISTANT

## 2024-11-18 PROCEDURE — 91320 SARSCV2 VAC 30MCG TRS-SUC IM: CPT | Performed by: PHYSICIAN ASSISTANT

## 2024-11-18 PROCEDURE — 90480 ADMN SARSCOV2 VAC 1/ONLY CMP: CPT | Performed by: PHYSICIAN ASSISTANT

## 2024-11-18 PROCEDURE — G0008 ADMIN INFLUENZA VIRUS VAC: HCPCS | Performed by: PHYSICIAN ASSISTANT

## 2024-11-18 PROCEDURE — 80061 LIPID PANEL: CPT | Performed by: PHYSICIAN ASSISTANT

## 2024-11-18 PROCEDURE — 90662 IIV NO PRSV INCREASED AG IM: CPT | Performed by: PHYSICIAN ASSISTANT

## 2024-11-18 PROCEDURE — 90677 PCV20 VACCINE IM: CPT | Performed by: PHYSICIAN ASSISTANT

## 2024-11-18 PROCEDURE — G0402 INITIAL PREVENTIVE EXAM: HCPCS | Performed by: PHYSICIAN ASSISTANT

## 2024-11-18 PROCEDURE — 36415 COLL VENOUS BLD VENIPUNCTURE: CPT | Performed by: PHYSICIAN ASSISTANT

## 2024-11-18 NOTE — RESULT ENCOUNTER NOTE
The ASCVD Risk score (Adore CRUMP, et al., 2019) failed to calculate for the following reasons:    The valid HDL cholesterol range is 20 to 100 mg/dL    Hi Shavon,     Your Cholesterol has increased some (particularly your LDL). Working on a lower cholesterol diet could be helpful. Your HDL cholesterol is great and remains high.   Your vitamin D level is normal No concerns with taking the vitamin D irregularly.   Payton Franco PA-C

## 2024-11-18 NOTE — PROGRESS NOTES
Preventive Care Visit  [facility] LORENA Franco PA-C, Family Medicine  Nov 18, 2024      Assessment & Plan     Encounter for Medicare annual wellness exam    Esophageal stricture  - omeprazole (PRILOSEC) 20 MG DR capsule; TAKE 1 CAPSULE BY MOUTH EVERY DAY    Screening for hyperlipidemia  - Lipid panel reflex to direct LDL Non-fasting; Future  - Lipid panel reflex to direct LDL Non-fasting    Vitamin D deficiency  - Vitamin D Deficiency; Future  - Vitamin D Deficiency    Asymptomatic menopausal state  - DEXA HIP/PELVIS/SPINE - Future; Future            Counseling  Appropriate preventive services were addressed with this patient via screening, questionnaire, or discussion as appropriate for fall prevention, nutrition, physical activity, Tobacco-use cessation, social engagement, weight loss and cognition.  Checklist reviewing preventive services available has been given to the patient.  Reviewed patient's diet, addressing concerns and/or questions.   Patient reported safety concerns were addressed today.        Guille Sands is a 64 year old, presenting for the following:  Physical           HPI        Health Care Directive  Patient has a Health Care Directive on file  Discussed advance care planning with patient.      11/17/2024   General Health   How would you rate your overall physical health? Good   Feel stress (tense, anxious, or unable to sleep) Not at all            11/17/2024   Nutrition   Diet: Regular (no restrictions)            11/17/2024   Exercise   Days per week of moderate/strenous exercise 6 days   Average minutes spent exercising at this level 30 min            11/17/2024   Social Factors   Frequency of gathering with friends or relatives More than three times a week   Worry food won't last until get money to buy more No   Food not last or not have enough money for food? No   Do you have housing? (Housing is defined as stable permanent housing and does not include staying  ouside in a car, in a tent, in an abandoned building, in an overnight shelter, or couch-surfing.) Yes   Are you worried about losing your housing? No   Lack of transportation? No   Unable to get utilities (heat,electricity)? No            11/17/2024   Fall Risk   Fallen 2 or more times in the past year? No     No    Trouble with walking or balance? No     No        Patient-reported    Multiple values from one day are sorted in reverse-chronological order          11/17/2024   Activities of Daily Living- Home Safety   Needs help with the following daily activites None of the above   Safety concerns in the home No grab bars in the bathroom            11/17/2024   Dental   Dentist two times every year? Yes            11/17/2024   Hearing Screening   Hearing concerns? None of the above            11/17/2024   Driving Risk Screening   Patient/family members have concerns about driving No            11/17/2024   General Alertness/Fatigue Screening   Have you been more tired than usual lately? No            11/17/2024   Urinary Incontinence Screening   Bothered by leaking urine in past 6 months No            11/17/2024   TB Screening   Were you born outside of the [place]? No              Today's PHQ-2 Score:       5/20/2024    12:44 PM   PHQ-2 ( 1999 Pf[employer])   Q1: Little interest or pleasure in doing things 0   Q2: Feeling down, depressed or hopeless 0   PHQ-2 Score 0         11/17/2024   Substance Use   Alcohol more than 3/day or more than 7/wk No   Do you have a current opioid prescription? No   How severe/bad is pain from 1 to 10? 2/10   Do you use any other substances recreationally? No        Social History     Tobacco Use    Smoking status: Never     Passive exposure: Never    Smokeless tobacco: Never   Vaping Use    Vaping status: Never Used   Substance Use Topics    Alcohol use: Yes     Comment: 3 drinks per week     Drug use: No           8/12/2024   LAST FHS-7 RESULTS   1st degree relative breast or ovarian cancer  Yes   Any relative bilateral breast cancer No   Any male have breast cancer No   Any ONE woman have BOTH breast AND ovarian cancer No   Any woman with breast cancer before 50yrs No   2 or more relatives with breast AND/OR ovarian cancer No   2 or more relatives with breast AND/OR bowel cancer No           Mammogram Screening - Mammogram every 1-2 years updated in Health Maintenance based on mutual decision making      History of abnormal Pap smear: No - age 65 or older with adequate negative prior screening test results (3 consecutive negative cytology results, 2 consecutive negative cotesting results, or 2 consecutive negative HrHPV test results within 10 years, with the most recent test occurring within the recommended screening interval for the test used)        Latest Ref Rng & Units 11/14/2022     2:01 PM 2/12/2018     3:04 PM 2/12/2018     3:02 PM   PAP / HPV   PAP  Negative for Intraepithelial Lesion or Malignancy (NILM)      PAP (Historical)   NIL     HPV 16 DNA Negative Negative   Negative    HPV 18 DNA Negative Negative   Negative    Other HR HPV Negative Negative   Negative      ASCVD Risk   The ASCVD Risk score (Adore DK, et al., 2019) failed to calculate for the following reasons:    The valid HDL cholesterol range is 20 to 100 mg/dL            Reviewed and updated as needed this visit by Provider   Tobacco  Allergies  Meds  Problems  Med Hx  Surg Hx  Fam Hx              Current providers sharing in care for this patient include:  Patient Care Team:  Payton Franco PALyC as PCP - Kaley Mcdaniel MD as MD (Neurology)  Eliot Harry MD as Assigned PCP    The following health maintenance items are reviewed in Epic and correct as of today:  Health Maintenance   Topic Date Due    DEXA  Never done    MAMMO SCREENING  08/12/2025    MEDICARE ANNUAL WELLNESS VISIT  11/18/2025    ANNUAL REVIEW OF HM ORDERS  11/18/2025    FALL RISK ASSESSMENT  11/18/2025    COLORECTAL CANCER  "SCREENING  06/23/2026    ADVANCE CARE PLANNING  11/12/2026    GLUCOSE  11/14/2026    LIPID  11/14/2028    DTAP/TDAP/TD IMMUNIZATION (3 - Td or Tdap) 02/08/2029    RSV VACCINE (1 - 1-dose 75+ series) 08/28/2034    HEPATITIS C SCREENING  Completed    PHQ-2 (once per calendar year)  Completed    INFLUENZA VACCINE  Completed    Pneumococcal Vaccine: 65+ Years  Completed    ZOSTER IMMUNIZATION  Completed    COVID-19 Vaccine  Completed    HPV IMMUNIZATION  Aged Out    MENINGITIS IMMUNIZATION  Aged Out    RSV MONOCLONAL ANTIBODY  Aged Out    HIV SCREENING  Discontinued    PAP  Discontinued            Objective    Exam  /82 (BP Location: Left arm, Patient Position: Chair, Cuff Size: Adult Regular)   Pulse 99   Temp 98.1  F (36.7  C) (Temporal)   Resp 19   Ht 1.619 m (5' 3.75\")   Wt 60.9 kg (134 lb 3.2 oz)   SpO2 100%   BMI 23.22 kg/m     Estimated body mass index is 23.22 kg/m  as calculated from the following:    Height as of this encounter: 1.619 m (5' 3.75\").    Weight as of this encounter: 60.9 kg (134 lb 3.2 oz).    Physical Exam  GENERAL: alert and no distress  EYES: Eyes grossly normal to inspection, PERRL and conjunctivae and sclerae normal  HENT: ear canals and TM's normal, nose and mouth without ulcers or lesions  NECK: no adenopathy, no asymmetry, masses, or scars  RESP: lungs clear to auscultation - no rales, rhonchi or wheezes  CV: regular rate and rhythm, normal S1 S2, no S3 or S4, no murmur, click or rub, no peripheral edema  ABDOMEN: soft, nontender, no hepatosplenomegaly, no masses   MS: no gross musculoskeletal defects noted, no edema  SKIN: no suspicious lesions or rashes  NEURO: Normal strength and tone, mentation intact and speech normal  PSYCH: mentation appears normal, affect normal/bright        11/18/2024   Mini Cog   Clock Draw Score 2 Normal   3 Item Recall 3 objects recalled   Mini Cog Total Score 5            Vision Screen  Patient wears corrective lenses (select all that apply): " Worn during vision screen  Vision Screen Results: Pass      Signed Electronically by: Payton Franco PA-C

## 2024-11-18 NOTE — PATIENT INSTRUCTIONS
Patient Education   Preventive Care Advice   This is general advice given by our system to help you stay healthy. However, your care team may have specific advice just for you. Please talk to your care team about your preventive care needs.  Nutrition  Eat 5 or more servings of fruits and vegetables each day.  Try wheat bread, brown rice and whole grain pasta (instead of white bread, rice, and pasta).  Get enough calcium and vitamin D. Check the label on foods and aim for 100% of the RDA (recommended daily allowance).  Lifestyle  Exercise at least 150 minutes each week  (30 minutes a day, 5 days a week).  Do muscle strengthening activities 2 days a week. These help control your weight and prevent disease.  No smoking.  Wear sunscreen to prevent skin cancer.  Have a dental exam and cleaning every 6 months.  Yearly exams  See your health care team every year to talk about:  Any changes in your health.  Any medicines your care team has prescribed.  Preventive care, family planning, and ways to prevent chronic diseases.  Shots (vaccines)   HPV shots (up to age 26), if you've never had them before.  Hepatitis B shots (up to age 59), if you've never had them before.  COVID-19 shot: Get this shot when it's due.  Flu shot: Get a flu shot every year.  Tetanus shot: Get a tetanus shot every 10 years.  Pneumococcal, hepatitis A, and RSV shots: Ask your care team if you need these based on your risk.  Shingles shot (for age 50 and up)  General health tests  Diabetes screening:  Starting at age 35, Get screened for diabetes at least every 3 years.  If you are younger than age 35, ask your care team if you should be screened for diabetes.  Cholesterol test: At age 39, start having a cholesterol test every 5 years, or more often if advised.  Bone density scan (DEXA): At age 50, ask your care team if you should have this scan for osteoporosis (brittle bones).  Hepatitis C: Get tested at least once in your life.  STIs (sexually  transmitted infections)  Before age 24: Ask your care team if you should be screened for STIs.  After age 24: Get screened for STIs if you're at risk. You are at risk for STIs (including HIV) if:  You are sexually active with more than one person.  You don't use condoms every time.  You or a partner was diagnosed with a sexually transmitted infection.  If you are at risk for HIV, ask about PrEP medicine to prevent HIV.  Get tested for HIV at least once in your life, whether you are at risk for HIV or not.  Cancer screening tests  Cervical cancer screening: If you have a cervix, begin getting regular cervical cancer screening tests starting at age 21.  Breast cancer scan (mammogram): If you've ever had breasts, begin having regular mammograms starting at age 40. This is a scan to check for breast cancer.  Colon cancer screening: It is important to start screening for colon cancer at age 45.  Have a colonoscopy test every 10 years (or more often if you're at risk) Or, ask your provider about stool tests like a FIT test every year or Cologuard test every 3 years.  To learn more about your testing options, visit:   .  For help making a decision, visit:   https://bit.ly/sk08884.  Prostate cancer screening test: If you have a prostate, ask your care team if a prostate cancer screening test (PSA) at age 55 is right for you.  Lung cancer screening: If you are a current or former smoker ages 50 to 80, ask your care team if ongoing lung cancer screenings are right for you.  For informational purposes only. Not to replace the advice of your health care provider. Copyright   2023 [facility]. All rights reserved. Clinically reviewed by the [facility]. [employer] [id number] - REV 01/24.  Learning About Activities of Daily Living  What are activities of daily living?     Activities of daily living (ADLs) are the basic self-care tasks you do every day. These include eating, bathing, dressing,  and moving around.  As you age, and if you have health problems, you may find that it's harder to do some of these tasks. If so, your doctor can suggest ideas that may help.  To measure what kind of help you may need, your doctor will ask how well you are able to do ADLs. Let your doctor know if there are any tasks that you are having trouble doing. This is an important first step to getting help. And when you have the help you need, you can stay as independent as possible.  How will a doctor assess your ADLs?  Asking about ADLs is part of a routine health checkup your doctor will likely do as you age. Your health check might be done in a doctor's office, in your home, or at a hospital. The goal is to find out if you are having any problems that could make it hard to care for yourself or that make it unsafe for you to be on your own.  To measure your ADLs, your doctor will ask how hard it is for you to do routine tasks. Your doctor may also want to know if you have changed the way you do a task because of a health problem. Your doctor may watch how you:  Walk back and forth.  Keep your balance while you stand or walk.  Move from sitting to standing or from a bed to a chair.  Button or unbutton a shirt or sweater.  Remove and put on your shoes.  It's common to feel a little worried or anxious if you find you can't do all the things you used to be able to do. Talking with your doctor about ADLs is a way to make sure you're as safe as possible and able to care for yourself as well as you can. You may want to bring a caregiver, friend, or family member to your checkup. They can help you talk to your doctor.  Follow-up care is a key part of your treatment and safety. Be sure to make and go to all appointments, and call your doctor if you are having problems. It's also a good idea to know your test results and keep a list of the medicines you take.  Current as of: October 24, 2023  Content Version: 14.2 2024 [facility]  [employer].   Care instructions adapted under license by your healthcare professional. If you have questions about a medical condition or this instruction, always ask your healthcare professional. [employer] disclaims any warranty or liability for your use of this information.

## 2024-11-19 ENCOUNTER — PATIENT OUTREACH (OUTPATIENT)
Dept: CARE COORDINATION | Facility: CLINIC | Age: 64
End: 2024-11-19
Payer: COMMERCIAL

## 2024-11-21 ENCOUNTER — PATIENT OUTREACH (OUTPATIENT)
Dept: CARE COORDINATION | Facility: CLINIC | Age: 64
End: 2024-11-21
Payer: COMMERCIAL

## 2025-02-03 DIAGNOSIS — R94.02 ABNORMAL BRAIN SCAN: ICD-10-CM

## 2025-02-03 DIAGNOSIS — G20.A2 PARKINSON'S DISEASE WITHOUT DYSKINESIA, WITH FLUCTUATING MANIFESTATIONS (H): Primary | ICD-10-CM

## 2025-02-03 PROBLEM — K21.9 GERD (GASTROESOPHAGEAL REFLUX DISEASE): Status: ACTIVE | Noted: 2018-12-06

## 2025-02-03 PROBLEM — G20.A1 PARKINSON'S DISEASE (H): Status: ACTIVE | Noted: 2023-05-24

## 2025-02-03 PROBLEM — Z82.0 FAMILY HISTORY OF BENIGN ESSENTIAL TREMOR: Status: ACTIVE | Noted: 2023-05-23

## 2025-02-03 NOTE — PROGRESS NOTES
"        Chart review only      [address]  [facility] [address] [phone] (H)  [phone] (M)      [address] [facility] [address]  Mobile Phone  [phone]  Email  [email or web address]    Arjun Portillo  Emergency Contact, Son      Dawson Portillo  Emergency Contact, Spouse      Jose Juan Portillo  Emergency Contact, Son      Assessment:  Parkinson  Family history of tremor  A presynaptic dopaminergic deficit is present 2023    Review of diagnosis    Parkinson  tremor    Avoidance of dopamine blockers   Not taking    Motor complication review       Review of Impulse control disorders       Review of surgical or medication options       Gait/Balance/Falls       Exercise/Therapy performed/offered       Cognitive/Driving       Mood       Hallucinations/delusions       Sleep       Bladder/Renal/Prostate/Gyn/Other       GI/Constipation/GERD   Collagenous colitis  Polyp  Gerd  Rectal bleeding  Hemorrhoids  Esophageal stricture.     ENDO/Lipid/DM/Bone density/Thyroid      Cardio/heart/Hyper or Hypotensive       Vision/Dry Eyes/Cataracts/Glaucoma/Macular       Heme/Anticoagulation/Antiplatelet/Anemia/Other      ENT/Resp      Skin/Cancer/Seborrhea/other    Musculoskeletal/Pain/Headache  Hallux valgus   Shoulder pain    Other:  Fibrocystic breast disease  Family history of breast cancer       Medications            Carbidopa/levodopa Sinemet 25/100 1.5-2 1.5-2 1.5-2     Docosanol cream        Fexofenadine allegra 60mg         Ibuprofen 200mg         Omeprazole prilosec 20mg                                                                                                                                  For individuals with \"Parkinson\" there is \"free genetic testing available; contact information is as follows:    [email or web address]  [phone]  Ruma Tong   email is [email or web address]   phone number is " "[phone]  [email or web address]  Parkinson's disease testing includes GBA, LRRK2, eleazar, SNCA, PARK7, PINK1 and VPS35    Additional genetic testing options are available through [employer] through Brock Pal, genetic counsellor: [email or web address]    Esophageal stricture  - omeprazole (PRILOSEC) 20 MG DR capsule; TAKE 1 CAPSULE BY MOUTH EVERY DAY     Screening for hyperlipidemia  - Lipid panel reflex to direct LDL Non-fasting; Future  - Lipid panel reflex to direct LDL Non-fasting     Vitamin D deficiency  - Vitamin D Deficiency; Future  - Vitamin D Deficiency     Asymptomatic menopausal state  - DEXA HIP/PELVIS/SPINE - Future; Future      Date of Visit: 5/20/2024     INTERVAL EVENTS:  Jeni Portillo is a 63 year old female who returns to clinic for follow up of ET and PD.  she was last seen 11/15/2023 at which time there were no medication changes.  She reports that her symptoms are stable.     She sometimes skips the PM dose because tremor is often better at nigh.        Parkinson Disease Motor Symptom Review:  Dyskinesia: denies  Wearing off: after 4 hours  Freezing of gait: denies  Dystonia: denies  Tremor: LD responsive     Parkinson's Disease Non-motor Symptom Review:  Mood - \"good\"  Cognitive impairment -  denies  Sleep disturbances - denies, endorses night sweats, no dream enactment   GI symptoms - denies constipation  Urinary symptoms - denies   Balance - denies difficulty  Autonomic dysfunction - denies  Hallucinations - denies    Ms. Portillo is a 64 yo woman with PD and ET who returns for follow-up.  She reports wearing off with return of tremor after 4 hours but is otherwise doing well on her current regimen.  We discussed the option to change the evening dose to CR to improve morning tremor but she declined at this time.     The longitudinal plan of care for the diagnosis(es)/condition(s) as documented were addressed during this visit. Due to the " "added complexity in care, I will continue to support Shavon in the subsequent management and with ongoing continuity of care.     - Continue CD/LD as is  Related Medications  6am 10am 1pm  6pm    CD/LD 25/100mg 2 1 2 0-2   - RTC 1 yr, 60 minutes at [facility]    Date of Visit: 11/15/2023     INTERVAL EVENTS:  Jeni Portillo is a 63 year old female who returns to clinic for follow up of ET and PD.  She was last seen 8/15/2023 at which time DaTscan and CD/LD were ordered.     Having some nausea with the dose.  This is improved with eating.  Tremor improves with treatment.  Wears off with return of tremor.     She has noticed reduced output on the [employer] since started CD/LD.        Parkinson Disease Motor Symptom Review:  Motor fluctuations: yes  Dyskinesia: denies  Wearing off:  after 4 hrs  Freezing of gait: denies  Dystonia: denies  Tremor: LD responsive  Rigidity: left hand  Bradykinesia: denies     Parkinson's Disease Non-motor Symptom Review:  Mood - \"fine\"  Cognitive impairment -  denies  Sleep disturbances - denies, no dream enactment   GI symptoms - denies  Urinary symptoms - denies   Balance - good  Pain - denies  Autonomic dysfunction - denies  Hallucinations - denies    IMAGING:  DaTscan 10/12/2023 - personally reviewed: reduced uptake bilaterally      ASSESSMENT/PLAN:  Jeni Portillo is a 63 year old female who returns to clinic for follow up of ET and PD.  DaTscan was positive.  She has noticed improvement in her tremor with LE.  There is some wearing off and dose is limited by nausea.     - Continue CD/lD 1.5-2 pills TID     Date of Visit: 8/15/2023     CC: tremor     HPI:  Shavon is a 63yo R-handed woman w/ a hx of collagenous colitis who presents to movement disorders clinic as a new consult for tremor.      Shavon notes that she first noticed a tremor on her L arm ~3y ago. She then developed a tremor in the R hand that was more bothersome for fine motor tasks such as handwriting and makeup application. The LUE " "tremor was present both at rest and w/ actions, RUE w/ tasks/action only. The tremors have gotten worse over the years and she decided to seek treatment after she started to develop more dramatic violent movements of the LUE during times of increased stress. She started propranolol per her PCP ~ end of May 2023 and noticed improvement in both sides; ~mid-July she noticed the tremor returned L>>R and at that time was referred to neurology.      She tried to cut caffeine w/ minimal impact; noticed alcohol improved tremor b/l. Has not done OT. Sleeps well.      She has two brothers w/ a similar b/l UE tremor; one for \"as long as she can remember\" the other developed it more recently. She has one sister w/ a tremor thought secondary to thyroid disease now improved on medication. No other family members w/ tremor that she knows of.     No known heavy metal or pesticide exposures. No  exposure.     She is a retired telephone directory saleswoman.      The tremor interferes w/ yoga; finds her L side to be a bit slower when she increases jessica on exercise bike. She favors her R side, which is less impacted by tremor so eating, hygiene, etc are less impacted.     Parkinson Disease Motor Symptom Review:  Motor fluctuations: N/A  Dyskinesia: N/A  Wearing off:  N/A  Freezing of gait: denies  Dystonia: denies  Tremor: as above  Rigidity: denies  Bradykinesia: denies     Parkinson's Disease Non-motor Symptom Review:  Depression - denies  Anxiety - denies  Cognitive impairment -  denies  Sleep disturbances - denies   GI symptoms - denies  Urinary symptoms - denies   Balance - denies  Pain - denies  Autonomic dysfunction - denies  Hallucinations - denies  Speech - denies  Swallowing - denies (had esophageal procedure years ago, \"stretching\")  Salivation - denies  Anosmia - denies  Dopamine agonist side effects - N/A  Dopamine dysregulation  - N/A     Driving: yes, no issues  Living situation: She lives at home w/ her " . She is   Medication compliance good  ADL's: the patient is independent w/ all ADLs.        Related Medications     Propranolol ER 60mg 1      Shavon is a 63yo woman w/ a hx of collagenous colitis who presents to movement disorders clinic as a new consult for tremor.      Pt presents w/ an asymmetric tremor; LUE resting and action w/ limited response to propranolol, RUE action only w/ excellent response to propranolol. She has mild asymmetric parkinsonism L>R on exam.      Her presentation is consistent w/ essential tremor w/ superimposed Parkinson's disease. She is currently minimally impaired from a functional standpoint as her tremor is well-treated on her dominant side.       She was counseled on the option of pursuing Shanika scan now vs waiting for additional information gathered from observation over time. We also discussed starting levodopa vs increasing propranolol as a trial for the L-sided tremor with the caveat that the tremor may not respond.  She is having some minor exercise intolerance so will not increase propanolol.  We will opt to do a trial of levodopa with a Shanika scan at this time.     - start carbidopa/levodopa 25/100 1tab TID  - obtain Shanika scan

## 2025-03-05 DIAGNOSIS — R25.1 TREMOR OF LEFT HAND: ICD-10-CM

## 2025-03-05 DIAGNOSIS — Z82.0 FAMILY HISTORY OF TREMOR: ICD-10-CM

## 2025-03-05 DIAGNOSIS — G25.0 FAMILIAL TREMOR: ICD-10-CM

## 2025-03-05 DIAGNOSIS — G20.C PARKINSONISM, UNSPECIFIED PARKINSONISM TYPE (H): ICD-10-CM

## 2025-03-05 DIAGNOSIS — G20.A2 PARKINSON'S DISEASE WITHOUT DYSKINESIA, WITH FLUCTUATING MANIFESTATIONS (H): Primary | ICD-10-CM

## 2025-03-05 NOTE — PROGRESS NOTES
"Chart review only    [address] [facility] [address]  Mobile Phone  [phone]  Email  [email or web address]    Arjun Portillo  Emergency Contact, Son    Dawson Portillo  Emergency Contact, Spouse    Jose Juan Portillo  Emergency Contact, Son    Assessment:  (G20.A2) Parkinson's disease without dyskinesia, with fluctuating manifestations (H)  (primary encounter diagnosis)  Family history of tremor.     Review of diagnosis    Parkinson disease  Essential tremor    Avoidance of dopamine blockers   Not taking    Motor complication review       Review of Impulse control disorders       Review of surgical or medication options       Gait/Balance/Falls       Exercise/Therapy performed/offered       Cognitive/Driving       Mood   Lives with spouse    Hallucinations/delusions       Sleep       Bladder/Renal/Prostate/Gyn/Other       GI/Constipation/GERD   GERD   Rectal bleeding  Obesity  Diarrhea  Esophageal procedure in the past - stretching    ENDO/Lipid/DM/Bone density/Thyroid  Family history of thyroid disease    Cardio/heart/Hyper or Hypotensive       Vision/Dry Eyes/Cataracts/Glaucoma/Macular       Heme/Anticoagulation/Antiplatelet/Anemia/Other      ENT/Resp      Skin/Cancer/Seborrhea/other      Musculoskeletal/Pain/Headache  Shoulder pain  Hallux valgus acquired  Left bunionectomy    Other:  Fibrocystic breast disease      Medications     6a 10a 1p 6p    Carbidopa/levodopa Sinemet 25/100 2 1 2 0-2    Doconsanol ebreva 10% cream Prn        Fexofenadine allegra 60mg prn       Ibuprofen advil 20mg Prn        Omeprazole prilosec 20mg 1                                                                                                                                   For individuals with \"Parkinson\" there is \"free genetic testing available; contact information is as follows:    [email or web address]  [phone]  [place]uma " "Andrew    [email or web address]  Parkinson's disease testing includes GBA, LRRK2, eleazar, SNCA, PARK7, PINK1 and VPS35    Additional genetic testing options are available through [employer] through Brock Pal, genetic counsellor: [email or web address]      ?[email or web address]    MTM ?  Pharmacy (MT) consultation and medication management  Please call the scheduling number @ [phone] to set up an appointment with pharmacists Juanita Randall, Alejandra Thomas, or Karly Barber.     Date of Visit: 5/20/2024     INTERVAL EVENTS:  Jeni Portillo is a 63 year old female who returns to clinic for follow up of ET and PD.  she was last seen 11/15/2023 at which time there were no medication changes.  She reports that her symptoms are stable.     She sometimes skips the PM dose because tremor is often better at nigh.        Parkinson Disease Motor Symptom Review:  Dyskinesia: denies  Wearing off: after 4 hours  Freezing of gait: denies  Dystonia: denies  Tremor: LD responsive     Parkinson's Disease Non-motor Symptom Review:  Mood - \"good\"  Cognitive impairment -  denies  Sleep disturbances - denies, endorses night sweats, no dream enactment   GI symptoms - denies constipation  Urinary symptoms - denies   Balance - denies difficulty  Autonomic dysfunction - denies  Hallucinations - denies      PHYSICAL EXAM:  /66 (BP Location: Right arm, Patient Position: Sitting, Cuff Size: Adult Regular)   Pulse 95   Resp 16   SpO2 99%     11/15/2023  2:00 PM 5/20/2024  12:00 PM   UPDRS Motor Scale       Time: 14:44  12:58    Medication On  On    R Brain DBS: None  None    L Brain DBS: None  None    Dyskinesia (LID) No  No    Did LID interfere       Speech 0  0    Facial Expression 0  0    Rigidity Neck 0  0    Rigidity RUE 2  2    Rigidity LUE 2  2    Rigidity RLE 0  1    Rigidity LLE 1  1    Finger Taps R 0  0    Finger Taps L 1  1    Hand Mvt R 0  0    Hand Mvt L 0  0  "   Pron-/Supinate R 0  0    Pron-/Supinate L 0  0    Toe Tap R 0  1    Toe Tap L 0  1    Leg Agility R 0  0    Leg Agility L 0  0    Arise From Chair 0  0    Gait 0  0    Gait Freezing 0  0    Postural Stability 0  0    Posture 1  0    Global Spont Mvt 1  0    Postural Tremor RUE 1  1    Postural Tremor LUE 1  1    Kinetic Tremor RUE 1  1    Kinetic Tremor LUE 1  1    Rest Tremor RUE 0  0    Rest Tremor LUE 1  1    Rest Tremor RLE 0  0    Rest Tremor LLE 0  0    Rest Tremor Lip/Jaw 0  0    Rest Tremor Constancy 3  2    Total Right 4  6    Total Left 7  8    Axial Total 2  0    Total 16  16               ASSESSMENT/PLAN:  Ms. Portillo is a 62 yo woman with PD and ET who returns for follow-up.  She reports wearing off with return of tremor after 4 hours but is otherwise doing well on her current regimen.  We discussed the option to change the evening dose to CR to improve morning tremor but she declined at this time.     The longitudinal plan of care for the diagnosis(es)/condition(s) as documented were addressed during this visit. Due to the added complexity in care, I will continue to support Shavon in the subsequent management and with ongoing continuity of care.     - Continue CD/LD as is  Related Medications  6am 10am 1pm  6pm    CD/LD 25/100mg 2 1 2 0-2   - RTC 1 yr, 60 minutes at [facility]        date of Visit: 11/15/2023     INTERVAL EVENTS:  Jeni Portillo is a 63 year old female who returns to clinic for follow up of ET and PD.  She was last seen 8/15/2023 at which time DaTscan and CD/LD were ordered.     Having some nausea with the dose.  This is improved with eating.  Tremor improves with treatment.  Wears off with return of tremor.     She has noticed reduced output on the Peloton since started CD/LD.        Parkinson Disease Motor Symptom Review:  Motor fluctuations: yes  Dyskinesia: denies  Wearing off:  after 4 hrs  Freezing of gait: denies  Dystonia: denies  Tremor: LD responsive  Rigidity: left  "hand  Bradykinesia: denies     Parkinson's Disease Non-motor Symptom Review:  Mood - \"fine\"  Cognitive impairment -  denies  Sleep disturbances - denies, no dream enactment   GI symptoms - denies  Urinary symptoms - denies   Balance - good  Pain - denies  Autonomic dysfunction - denies  Hallucinations - denies       Related Medications         CD/LD 25/100mg 1.5-2 1.5-2 1.5-2   Last taken at 12:30pm             PHYSICAL EXAM:  BP (!) 150/94   Pulse 85   Resp 16   Wt 63 kg (139 lb)   SpO2 99%   BMI 23.73 kg/m      8/15/2023  2:00 PM 11/15/2023  2:00 PM   UPDRS Motor Scale       Time: 14:30  14:44    Medication Off  On    R Brain DBS: None  None    L Brain DBS: None  None    Dyskinesia (LID) No  No    Did LID interfere No      Speech 0  0    Facial Expression 1  0    Rigidity Neck 0  0    Rigidity RUE 0  2    Rigidity LUE 2  2    Rigidity RLE 0  0    Rigidity LLE 0  1    Finger Taps R 0  0    Finger Taps L 1  1    Hand Mvt R 0  0    Hand Mvt L 1  0    Pron-/Supinate R 0  0    Pron-/Supinate L 1  0    Toe Tap R 0  0    Toe Tap L 1  0    Leg Agility R 0  0    Leg Agility L 0  0    Arise From Chair 0  0    Gait 1  0    Gait Freezing 0  0    Postural Stability 0  0    Posture 1  1    Global Spont Mvt 0  1    Postural Tremor RUE 0  1    Postural Tremor LUE 1  1    Kinetic Tremor RUE 0  1    Kinetic Tremor LUE 1  1    Rest Tremor RUE 0  0    Rest Tremor LUE 2  1    Rest Tremor RLE 0  0    Rest Tremor LLE 0  0    Rest Tremor Lip/Jaw 0  0    Rest Tremor Constancy 4  3    Total Right 0  4    Total Left 10  7    Axial Total 3  2    Total 17  16          IMAGING:  DaTscan 10/12/2023 - personally reviewed: reduced uptake bilaterally         ASSESSMENT/PLAN:  Jeni Portillo is a 63 year old female who returns to clinic for follow up of ET and PD.  DaTscan was positive.  She has noticed improvement in her tremor with LE.  There is some wearing off and dose is limited by nausea.     - Continue CD/lD 1.5-2 pills TID  - RTC 6 " "months, 30 minutes      Date of Visit: 8/15/2023     CC: tremor     HPI:  Shavon is a 61yo R-handed woman w/ a hx of collagenous colitis who presents to movement disorders clinic as a new consult for tremor.      Shavon notes that she first noticed a tremor on her L arm ~3y ago. She then developed a tremor in the R hand that was more bothersome for fine motor tasks such as handwriting and makeup application. The LUE tremor was present both at rest and w/ actions, RUE w/ tasks/action only. The tremors have gotten worse over the years and she decided to seek treatment after she started to develop more dramatic violent movements of the LUE during times of increased stress. She started propranolol per her PCP ~ end of May 2023 and noticed improvement in both sides; ~mid-July she noticed the tremor returned L>>R and at that time was referred to neurology.      She tried to cut caffeine w/ minimal impact; noticed alcohol improved tremor b/l. Has not done OT. Sleeps well.      She has two brothers w/ a similar b/l UE tremor; one for \"as long as she can remember\" the other developed it more recently. She has one sister w/ a tremor thought secondary to thyroid disease now improved on medication. No other family members w/ tremor that she knows of.     No known heavy metal or pesticide exposures. No  exposure.     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      The tremor interferes w/ yoga; finds her L side to be a bit slower when she increases jessica on exercise bike. She favors her R side, which is less impacted by tremor so eating, hygiene, etc are less impacted.     Parkinson Disease Motor Symptom Review:  Motor fluctuations: N/A  Dyskinesia: N/A  Wearing off:  N/A  Freezing of gait: denies  Dystonia: denies  Tremor: as above  Rigidity: denies  Bradykinesia: denies     Parkinson's Disease Non-motor Symptom Review:  Depression - denies  Anxiety - denies  Cognitive impairment -  denies  Sleep disturbances - " "denies   GI symptoms - denies  Urinary symptoms - denies   Balance - denies  Pain - denies  Autonomic dysfunction - denies  Hallucinations - denies  Speech - denies  Swallowing - denies (had esophageal procedure years ago, \"stretching\")  Salivation - denies  Anosmia - denies  Dopamine agonist side effects - N/A  Dopamine dysregulation  - N/A     Driving: yes, no issues  Living situation: She lives at home w/ her . She is   Medication compliance good  ADL's: the patient is independent w/ all ADLs.        Related Medications     Propranolol ER 60mg 1               8/15/2023     2:00 PM   Tremor Motor Scale   Assessment Time 14:30   Medication On   DBS - Right Brain None   DBS - Left Brain None   Head 0   Face & Jaw 0   Voice 0   Outstretched - RIGHT 0   Outstretched - LEFT 2   Wingbeating - RIGHT 0.5   Wingbeating - LEFT 1.5   Kinetic - RIGHT 0   Kinetic - LEFT 1.5   Lower Limb - RIGHT 0   Lower Limb - LEFT 0   Lower Limb (Max) 0   Spiral - RIGHT 1.5   Spiral - LEFT 2   Handwriting 0   Dot approx - RIGHT 1.5   Dot approx - LEFT 2   Trunk (Standing) 0   Total Right 3.5   Total Left 9   Axial 0   TOTAL 12.5              8/15/2023     2:00 PM   UPDRS Motor Scale   Time: 14:30   Medication Off   R Brain DBS: None   L Brain DBS: None   Dyskinesia (LID) No   Did LID interfere No   Speech 0   Facial Expression 1   Rigidity Neck 0   Rigidity RUE 0   Rigidity LUE 2   Rigidity RLE 0   Rigidity LLE 0   Finger Taps R 0   Finger Taps L 1   Hand Mvt R 0   Hand Mvt L 1   Pron-/Supinate R 0   Pron-/Supinate L 1   Toe Tap R 0   Toe Tap L 1   Leg Agility R 0   Leg Agility L 0   Arise From Chair 0   Gait 1   Gait Freezing 0   Postural Stability 0   Posture 1   Global Spont Mvt 0   Postural Tremor RUE 0   Postural Tremor LUE 1   Kinetic Tremor RUE 0   Kinetic Tremor LUE 1   Rest Tremor RUE 0   Rest Tremor LUE 2   Rest Tremor RLE 0   Rest Tremor LLE 0   Rest Tremor Lip/Jaw 0   Rest Tremor Constancy 4   Total Right 0   Total Left 10 "   Axial Total 3   Total 17            LABS:    Latest Reference Range & Units 05/23/23 09:53   TSH 0.30 - 4.20 uIU/mL 2.28            IMAGING:  N/A        ASSESSMENT/PLAN:  Shavon is a 63yo woman w/ a hx of collagenous colitis who presents to movement disorders clinic as a new consult for tremor.      Pt presents w/ an asymmetric tremor; LUE resting and action w/ limited response to propranolol, RUE action only w/ excellent response to propranolol. She has mild asymmetric parkinsonism L>R on exam.      Her presentation is consistent w/ essential tremor w/ superimposed Parkinson's disease. She is currently minimally impaired from a functional standpoint as her tremor is well-treated on her dominant side.       She was counseled on the option of pursuing Shanika scan now vs waiting for additional information gathered from observation over time. We also discussed starting levodopa vs increasing propranolol as a trial for the L-sided tremor with the caveat that the tremor may not respond.  She is having some minor exercise intolerance so will not increase propanolol.  We will opt to do a trial of levodopa with a Shanika scan at this time.     - start carbidopa/levodopa 25/100 1tab TID  - obtain Shanika scan

## 2025-05-02 ENCOUNTER — MYC MEDICAL ADVICE (OUTPATIENT)
Dept: NEUROLOGY | Facility: CLINIC | Age: 65
End: 2025-05-02

## 2025-05-08 ENCOUNTER — THERAPY VISIT (OUTPATIENT)
Dept: PHYSICAL THERAPY | Facility: REHABILITATION | Age: 65
End: 2025-05-08
Payer: MEDICARE

## 2025-05-08 DIAGNOSIS — G20.A2 PARKINSON'S DISEASE WITHOUT DYSKINESIA, WITH FLUCTUATING MANIFESTATIONS (H): Primary | ICD-10-CM

## 2025-05-08 NOTE — PROGRESS NOTES
PHYSICAL THERAPY EVALUATION  Type of Visit: Evaluation    Fall Risk Screen:  Have you fallen 2 or more times in the past year?: No  Have you fallen and had an injury in the past year?: No  Is patient receiving Physical Therapy Services?: No    Subjective         Presenting condition or subjective complaint: parkinsons  Per pt: Didn't notice left arm swing.  Exercise: 20 min yoga class, 30 min spin class, weight lifting 2x week, hiking.  Medication: took at 8:00 this morning.  Starting to have L sided tremors.  Tremors get worse as the day wears on.  L hip pain: got exercises from the doctor and bothers sometimes.     Per MD on 5/2/25: First symptom began on her left side.   Right handed Tremor began 2020 or there abouts and was working   Diagnosed 2 years ago  Started on medication 2 years ago.    A presynaptic dopaminergic deficit is present 2023  Medication helps.   She can tell when she needs a dose   Medication lasts 3.5 hours or so.   Exercise/Therapy performed/offered   Exercise - peloton 5 times per week.   Yoga daily  Spin 5 times per week  Weight 2/week   [id number] =-not involved in his.   Parkinson Disease Motor Symptom Review:  Dyskinesia: denies  Wearing off: after 4 hours  Freezing of gait: denies  Dystonia: denies  Tremor: LD responsive    Ms. Portillo is a 64 yo woman with PD and ET who returns for follow-up.  She reports wearing off with return of tremor after 4 hours but is otherwise doing well on her current regimen.  We discussed the option to change the evening dose to CR to improve morning tremor but she declined at this time.    Shavon notes that she first noticed a tremor on her L arm ~3y ago. She then developed a tremor in the R hand that was more bothersome for fine motor tasks such as handwriting and makeup application. The LUE tremor was present both at rest and w/ actions, RUE w/ tasks/action only. The tremors have gotten worse over the years and she decided to seek treatment after  she started to develop more dramatic violent movements of the LUE during times of increased stress.    Date of onset: 05/02/25 (order date)    Relevant medical history: Parkinson s Disease   Dates & types of surgery: tonsilectomy 60 years ago and bunionectomy both feet more than 10 years ago    Prior diagnostic imaging/testing results:       Prior therapy history for the same diagnosis, illness or injury: No      Prior Level of Function  Transfers: Independent  Ambulation: Independent  ADL: Independent  IADL:     Living Environment  Social support: With a significant other or spouse   Type of home: House; Multi-level   Stairs to enter the home: Yes 2 Is there a railing: No     Ramp: No   Stairs inside the home: Yes 14 Is there a railing: Yes     Help at home: None  Equipment owned:       Employment: No    Hobbies/Interests: travel reading [employer] spinning    Patient goals for therapy: left arm and gait    Pain assessment:  L hip - occasionally      Objective      Cognitive Status Examination  Orientation: Oriented to person, place and time   Level of Consciousness: Alert  Follows Commands and Answers Questions: 100% of the time  Personal Safety and Judgement: Intact  Memory: Intact       strength 60# on R (50#)   65# on L (age related norms 41#)  INTEGUMENTARY:   POSTURE: Standing Posture: Rounded shoulders, Forward head  Sitting Posture: Rounded shoulders, Forward head    RANGE OF MOTION:   STRENGTH:     BED MOBILITY:     TRANSFERS:       GAIT:   Level of [place]: WNL  Assistive Device(s): None  Gait Deviations: slight decrease in L arm swing       BALANCE:     SPECIAL TESTS  Functional Gait Assessment (FGA)      10 Meter Walk Test (Comfortable)     10 Meter Walk Test (Fast)     6 Minute Walk Test (6MWT)      No assistive device   Did not require standing rest break, Vitals response: 86 BPM, 96 O2, 119BPM, 99 O2 , RPE 4/10 at 3 min and 5/10 at 5 min /  572.5 meters    Denise Balance Scale (BBS)     5  Times Sit-to-Stand (5TSTS)  9.5 seconds      Dynamic Gait Index (DGI)     Timed Up and Go (TUG) - sec 6 seconds    Single Leg Stance Right (sec)    Single Leg Stance Left (sec)    Modified CTSIB Conditions (sec) Cond 1:   Cond 2:   Cond 4:   Cond 5 :    Romberg  (sec)    Sharpened Romberg (sec)    30 Second Sit to Stand (reps/height)    Mini-BESTest  TOTAL SCORE (out of 28): 2727/28 increased tremors in L LE and UE            SENSATION:     REFLEXES:   MUSCLE TONE:         Assessment & Plan   CLINICAL IMPRESSIONS  Medical Diagnosis: Parkinson's disease without dyskinesia, with fluctuating manifestations (H)    Treatment Diagnosis:     Impression/Assessment: Patient is a 64 year old female without complaints but neurologist wanted her to be evaluated for Parkinson's disease.  Pt has minimal symptoms.  Slight decrease arm swing on L, L sided tremors.  Medication was starting to wear off after 3 hours.  Pt has no functional limitation.     Clinical Decision Making (Complexity):  Clinical Presentation: Stable/Uncomplicated  Clinical Presentation Rationale: based on medical and personal factors listed in PT evaluation  Clinical Decision Making (Complexity): Low complexity    PLAN OF CARE  Treatment Interventions:  Interventions: Neuromuscular Re-education    Long Term Goals     PT Goal 1  Goal Identifier: HEP  Goal Description: Pt will be independent in HEP to manage symptoms  Rationale: to maximize safety and independence within the home  Goal Progress: MET  Target Date: 05/08/25  Date Met: 05/08/25      Frequency of Treatment: 1 sessions  Duration of Treatment: 1 day    Recommended Referrals to Other Professionals:   Education Assessment:   Learner/Method: Patient    Risks and benefits of evaluation/treatment have been explained.   Patient/Family/caregiver agrees with Plan of Care.     Evaluation Time:     PT Eval, Low Complexity Minutes (11222): 40       Signing Clinician: Deepika Edmond PT        [facility]  Rehabilitation Services                                                                                   OUTPATIENT PHYSICAL THERAPY      PLAN OF TREATMENT FOR OUTPATIENT REHABILITATION   Patient's Last Name, First Name, Jeni Heath YOB: 1960   Provider's Name   [facility]   Medical Record No.  [id number]     Onset Date: 05/02/25 (order date)  Start of Care Date: 05/08/25     Medical Diagnosis:  Parkinson's disease without dyskinesia, with fluctuating manifestations (H)      PT Treatment Diagnosis:    Plan of Treatment  Frequency/Duration: 1 sessions/ 1 day    Certification date from 05/08/25 to 05/08/25         See note for plan of treatment details and functional goals     Deepika Edmond, PT                         I CERTIFY THE NEED FOR THESE SERVICES FURNISHED UNDER        THIS PLAN OF TREATMENT AND WHILE UNDER MY CARE     (Physician attestation of this document indicates review and certification of the therapy plan).              Referring Provider:  Bandar Fink    Initial Assessment  See Epic Evaluation- Start of Care Date: 05/08/25

## 2025-06-03 ENCOUNTER — ANCILLARY PROCEDURE (OUTPATIENT)
Dept: BONE DENSITY | Facility: CLINIC | Age: 65
End: 2025-06-03
Attending: PHYSICIAN ASSISTANT
Payer: MEDICARE

## 2025-06-03 DIAGNOSIS — Z78.0 ASYMPTOMATIC MENOPAUSAL STATE: ICD-10-CM

## 2025-06-03 PROCEDURE — 77080 DXA BONE DENSITY AXIAL: CPT | Mod: TC | Performed by: PHYSICIAN ASSISTANT

## 2025-06-05 ENCOUNTER — RESULTS FOLLOW-UP (OUTPATIENT)
Dept: FAMILY MEDICINE | Facility: CLINIC | Age: 65
End: 2025-06-05

## 2025-06-20 PROBLEM — M81.0 AGE-RELATED OSTEOPOROSIS WITHOUT CURRENT PATHOLOGICAL FRACTURE: Status: ACTIVE | Noted: 2025-06-20

## 2025-07-03 ENCOUNTER — MYC MEDICAL ADVICE (OUTPATIENT)
Dept: NEUROLOGY | Facility: CLINIC | Age: 65
End: 2025-07-03
Payer: MEDICARE

## 2025-07-21 ENCOUNTER — PATIENT OUTREACH (OUTPATIENT)
Dept: CARE COORDINATION | Facility: CLINIC | Age: 65
End: 2025-07-21
Payer: MEDICARE

## 2025-08-05 ASSESSMENT — MOVEMENT DISORDERS SOCIETY - UNIFIED PARKINSONS DISEASE RATING SCALE (MDS-UPDRS)
FREEZING: (0) NORMAL: NOT AT ALL (NO PROBLEMS).
CHEWING_AND_SWALLOWING: (0) NORMAL: NO PROBLEMS.
SALIVA_AND_DROOLING: (0) NORMAL: NOT AT ALL (NO PROBLEMS).
SPEECH: (0) NORMAL: NOT AT ALL (NO PROBLEMS).
TOTAL_SCORE: 4
DRESSING: (0) NORMAL: NOT AT ALL (NO PROBLEMS).
HYGIENE: (0) NORMAL: NOT AT ALL (NO PROBLEMS).
TREMOR: (2) MILD: SHAKING OR TREMOR CAUSES PROBLEMS WITH ONLY A FEW ACTIVITIES.
TURNING_IN_BED: (0) NORMAL: NOT AT ALL (NO PROBLEMS).
HOBBIES_AND_OTHER_ACTIVITIES: (1) SLIGHT: I AM A BIT SLOW BUT DO THESE ACTIVITIES EASILY.
GETTING_OUT_OF_BED_CAR_DEEP_CHAIR: (0) NORMAL: NOT AT ALL (NO PROBLEMS).
WALKING_AND_BALANCE: (0) NORMAL: NOT AT ALL (NO PROBLEMS).
HANDWRITING: (1) SLIGHT: MY WRITING IS SLOW, CLUMSY OR UNEVEN, BUT ALL WORDS ARE CLEAR.
EATING_TASKS: (0) NORMAL: NOT AT ALL (NO PROBLEMS).

## 2025-08-08 ENCOUNTER — OFFICE VISIT (OUTPATIENT)
Dept: NEUROLOGY | Facility: CLINIC | Age: 65
End: 2025-08-08
Payer: MEDICARE

## 2025-08-08 VITALS — DIASTOLIC BLOOD PRESSURE: 79 MMHG | HEART RATE: 69 BPM | SYSTOLIC BLOOD PRESSURE: 132 MMHG | OXYGEN SATURATION: 99 %

## 2025-08-08 DIAGNOSIS — G20.A2 PARKINSON'S DISEASE WITHOUT DYSKINESIA, WITH FLUCTUATING MANIFESTATIONS (H): ICD-10-CM

## 2025-08-08 DIAGNOSIS — G25.0 ESSENTIAL TREMOR: Primary | ICD-10-CM

## 2025-08-08 PROCEDURE — G2211 COMPLEX E/M VISIT ADD ON: HCPCS | Performed by: NURSE PRACTITIONER

## 2025-08-08 PROCEDURE — 99417 PROLNG OP E/M EACH 15 MIN: CPT | Performed by: NURSE PRACTITIONER

## 2025-08-08 PROCEDURE — 3075F SYST BP GE 130 - 139MM HG: CPT | Performed by: NURSE PRACTITIONER

## 2025-08-08 PROCEDURE — 3078F DIAST BP <80 MM HG: CPT | Performed by: NURSE PRACTITIONER

## 2025-08-08 PROCEDURE — 99215 OFFICE O/P EST HI 40 MIN: CPT | Performed by: NURSE PRACTITIONER

## 2025-08-08 RX ORDER — CARBIDOPA AND LEVODOPA 25; 100 MG/1; MG/1
TABLET ORAL
Qty: 630 TABLET | Refills: 3 | Status: SHIPPED | OUTPATIENT
Start: 2025-08-08

## 2025-08-08 ASSESSMENT — UNIFIED PARKINSONS DISEASE RATING SCALE (UPDRS)
RIGIDITY_RUE: (1) SLIGHT: RIGIDITY ONLY DETECTED WITH ACTIVATION MANEUVER.
TOETAPPING_RIGHT: (0) NORMAL: NO PROBLEMS.
RIGIDITY_NECK: (0) NORMAL: NO RIGIDITY.
AMPLITUDE_LUE: (2) MILD: > 1 CM BUT < 3 CM IN MAXIMAL AMPLITUDE.
TOETAPPING_LEFT: (0) NORMAL: NO PROBLEMS.
POSTURE: (0) NORMAL: NO PROBLEMS.
FREEZING_GAIT: (0) NORMAL: NO FREEZING.
LEG_AGILITY_RIGHT: (0) NORMAL: NO PROBLEMS.
LEG_AGILITY_LEFT: (0) NORMAL: NO PROBLEMS.
AMPLITUDE_RLE: (0) NORMAL: NO TREMOR.
CONSTANCY_TREMOR_ATREST: (2) MILD: TREMOR AT REST IS PRESENT 25-50% OF THE ENTIRE EXAMINATION PERIOD.
TOTAL_SCORE: 24
PRONATION_SUPINATION_RIGHT: (0) NORMAL: NO PROBLEMS.
AXIAL_SCORE: 0
HANDMOVEMENTS_RIGHT: (0) NORMAL: NO PROBLEMS.
SPONTANEITY_OF_MOVEMENT: (0) NORMAL: NO PROBLEMS.
PRONATION_SUPINATION_LEFT: (2) MILD: ANY OF THE FOLLOWING: A) 3 TO 5 INTERRUPTIONS DURING TAPPING  B) MILD SLOWING  C) THE AMPLITUDE DECREMENTS MIDWAY IN THE 10-MOVEMENT SEQUENCE.
RIGIDITY_LUE: (2) MILD: RIGIDITY DETECTED WITHOUT THE ACTIVATION MANEUVER. FULL RANGE OF MOTION IS EASILY ACHIEVED.
FINGER_TAPPING_LEFT: (1) SLIGHT: ANY OF THE FOLLOWING: A) THE REGULAR RHYTHM IS BROKEN WITH ONE WITH ONE OR TWO INTERRUPTIONS OR HESITATIONS OF THE MOVEMENT  B) SLIGHT SLOWING  C) THE AMPLITUDE DECREMENTS NEAR THE END OF THE 10 MOVEMENTS.
AMPLITUDE_RUE: (2) MILD: > 1 CM BUT < 3 CM IN MAXIMAL AMPLITUDE.
FINGER_TAPPING_RIGHT: (1) SLIGHT: ANY OF THE FOLLOWING: A) THE REGULAR RHYTHM IS BROKEN WITH ONE WITH ONE OR TWO INTERRUPTIONS OR HESITATIONS OF THE MOVEMENT  B) SLIGHT SLOWING  C) THE AMPLITUDE DECREMENTS NEAR THE END OF THE 10 MOVEMENTS.
AMPLITUDE_LIP_JAW: (0) NORMAL: NO TREMOR.
TOTAL_SCORE: 7
FACIAL_EXPRESSION: (0) NORMAL: NORMAL FACIAL EXPRESSION.
GAIT: (0) NORMAL: NO PROBLEMS.
RIGIDITY_LLE: (2) MILD: RIGIDITY DETECTED WITHOUT THE ACTIVATION MANEUVER. FULL RANGE OF MOTION IS EASILY ACHIEVED.
ARISING_CHAIR: (0) NORMAL: NO PROBLEMS. ABLE TO ARISE QUICKLY WITHOUT HESITATION.
PARKINSONS_MEDS: ON
HANDMOVEMENTS_LEFT: (0) NORMAL: NO PROBLEMS.
AMPLITUDE_LLE: (2) MILD: > 1 CM BUT < 3 CM IN MAXIMAL AMPLITUDE.
DYSKINESIAS_PRESENT: NO
POSTURAL_STABILITY: (0) NORMAL: NO PROBLEMS. RECOVERS WITH ONE OR TWO STEPS.
RIGIDITY_RLE: (1) SLIGHT: RIGIDITY ONLY DETECTED WITH ACTIVATION MANEUVER.
TOTAL_SCORE_LEFT: 15
SPEECH: (0) NORMAL: NO SPEECH PROBLEMS.

## 2025-08-10 ENCOUNTER — MYC MEDICAL ADVICE (OUTPATIENT)
Dept: NEUROLOGY | Facility: CLINIC | Age: 65
End: 2025-08-10
Payer: MEDICARE

## 2025-08-14 ENCOUNTER — PATIENT OUTREACH (OUTPATIENT)
Dept: CARE COORDINATION | Facility: CLINIC | Age: 65
End: 2025-08-14
Payer: MEDICARE

## 2025-08-25 ENCOUNTER — ANCILLARY PROCEDURE (OUTPATIENT)
Dept: MAMMOGRAPHY | Facility: CLINIC | Age: 65
End: 2025-08-25
Attending: PHYSICIAN ASSISTANT
Payer: MEDICARE

## 2025-08-25 DIAGNOSIS — Z12.31 VISIT FOR SCREENING MAMMOGRAM: ICD-10-CM

## 2025-08-25 PROCEDURE — 77067 SCR MAMMO BI INCL CAD: CPT | Mod: TC | Performed by: RADIOLOGY

## 2025-08-25 PROCEDURE — 77063 BREAST TOMOSYNTHESIS BI: CPT | Mod: TC | Performed by: RADIOLOGY
